# Patient Record
Sex: MALE | Race: WHITE | URBAN - METROPOLITAN AREA
[De-identification: names, ages, dates, MRNs, and addresses within clinical notes are randomized per-mention and may not be internally consistent; named-entity substitution may affect disease eponyms.]

---

## 2018-01-11 ENCOUNTER — TRANSFERRED RECORDS (OUTPATIENT)
Dept: HEALTH INFORMATION MANAGEMENT | Facility: CLINIC | Age: 73
End: 2018-01-11

## 2018-02-27 ENCOUNTER — TRANSFERRED RECORDS (OUTPATIENT)
Dept: HEALTH INFORMATION MANAGEMENT | Facility: CLINIC | Age: 73
End: 2018-02-27

## 2018-04-20 ENCOUNTER — TRANSFERRED RECORDS (OUTPATIENT)
Dept: HEALTH INFORMATION MANAGEMENT | Facility: CLINIC | Age: 73
End: 2018-04-20

## 2018-05-02 ENCOUNTER — TRANSFERRED RECORDS (OUTPATIENT)
Dept: HEALTH INFORMATION MANAGEMENT | Facility: CLINIC | Age: 73
End: 2018-05-02

## 2018-05-02 ENCOUNTER — MEDICAL CORRESPONDENCE (OUTPATIENT)
Dept: HEALTH INFORMATION MANAGEMENT | Facility: CLINIC | Age: 73
End: 2018-05-02

## 2018-05-14 ENCOUNTER — PRE VISIT (OUTPATIENT)
Dept: NEUROLOGY | Facility: CLINIC | Age: 73
End: 2018-05-14

## 2018-05-14 NOTE — TELEPHONE ENCOUNTER
FUTURE VISIT INFORMATION      FUTURE VISIT INFORMATION:    Date: 6/6/18    Time: 2p    [place] [facility] [place]:    Referring provider:  Dr. David Linda    Referring providers clinic:  [facility]    Reason for visit/diagnosis  Parkinsonian features; apraxia of left hand; and upper motor neuron facial palsy    RECORDS REQUESTED FROM:       Clinic name Comments Records Status Imaging Status   [facility] Referral, OV, Imaging Receieved                                    RECORDS STATUS        RECORDS RECEIVED FROM: [facility]   DATE RECEIVED: 5/14/18   NOTES (FOR ALL VISITS) STATUS DETAILS   OFFICE NOTE from referring provider Received  5/2/18, 4/20/18, 1/11/18   OFFICE NOTE from other specialist N/A    DISCHARGE SUMMARY from hospital N/A    DISCHARGE REPORT from the ER N/A    OPERATIVE REPORT N/A    MEDICATION LIST Received 5/2/18   IMAGING  (FOR ALL VISITS)     EMG N/A    EEG N/A    ECT N/A    MRI (HEAD, NECK, SPINE) Received MR Brain report: 2/27/18   CT (HEAD, NECK, SPINE) Received CT Head report: 7/11/16

## 2018-05-27 ASSESSMENT — ENCOUNTER SYMPTOMS
HYPERTENSION: 1
POLYDIPSIA: 0
SYNCOPE: 0
SNORES LOUDLY: 1
DYSURIA: 0
MYALGIAS: 0
BACK PAIN: 1
ORTHOPNEA: 0
EYE IRRITATION: 0
PALPITATIONS: 1
POSTURAL DYSPNEA: 0
FEVER: 0
ALTERED TEMPERATURE REGULATION: 0
HEADACHES: 0
MEMORY LOSS: 1
ARTHRALGIAS: 0
HEMATURIA: 0
FLANK PAIN: 0
NECK PAIN: 0
STIFFNESS: 1
COUGH DISTURBING SLEEP: 0
COUGH: 1
DECREASED APPETITE: 1
BRUISES/BLEEDS EASILY: 1
SLEEP DISTURBANCES DUE TO BREATHING: 0
SPEECH CHANGE: 0
HALLUCINATIONS: 0
EYE WATERING: 0
WHEEZING: 0
CHILLS: 0
SWOLLEN GLANDS: 0
NIGHT SWEATS: 0
MUSCLE CRAMPS: 0
DYSPNEA ON EXERTION: 0
INCREASED ENERGY: 1
DISTURBANCES IN COORDINATION: 1
LOSS OF CONSCIOUSNESS: 0
JOINT SWELLING: 0
HYPOTENSION: 0
SPUTUM PRODUCTION: 0
DOUBLE VISION: 1
EYE REDNESS: 0
POLYPHAGIA: 0
WEIGHT GAIN: 0
NUMBNESS: 0
FATIGUE: 1
WEIGHT LOSS: 1
WEAKNESS: 1
TINGLING: 0
SEIZURES: 0
MUSCLE WEAKNESS: 1
LIGHT-HEADEDNESS: 1
LEG PAIN: 0
TREMORS: 0
EXERCISE INTOLERANCE: 1
DIFFICULTY URINATING: 0
HEMOPTYSIS: 0
DIZZINESS: 1
SHORTNESS OF BREATH: 0
PARALYSIS: 0
EYE PAIN: 0

## 2018-06-04 NOTE — TELEPHONE ENCOUNTER
FUTURE VISIT INFORMATION      FUTURE VISIT INFORMATION:    Date: 06/06/2018    Time:     Location:   REFERRAL INFORMATION:    Referring provider:  Chaz Siu MD    Referring providers clinic:      Reason for visit/diagnosis      RECORDS REQUESTED FROM:       Clinic name Comments Records Status Imaging Status   [facility]        Referral, OV, Imaging Receieved                                    RECORDS STATUS      RECORDS RECEIVED FROM: [facility]   DATE RECEIVED: 5/14/18   NOTES (FOR ALL VISITS) STATUS DETAILS   OFFICE NOTE from referring provider Received  5/2/18, 4/20/18, 1/11/18   OFFICE NOTE from other specialist N/A     DISCHARGE SUMMARY from hospital N/A     DISCHARGE REPORT from the ER N/A     OPERATIVE REPORT N/A     MEDICATION LIST Received 5/2/18   IMAGING  (FOR ALL VISITS)       EMG N/A     EEG N/A     ECT N/A     MRI (HEAD, NECK, SPINE) Received MR Brain report: 2/27/18   CT (HEAD, NECK, SPINE) Received CT Head report: 7/11/16

## 2018-06-06 ENCOUNTER — PRE VISIT (OUTPATIENT)
Dept: NEUROLOGY | Facility: CLINIC | Age: 73
End: 2018-06-06

## 2018-06-06 ENCOUNTER — OFFICE VISIT (OUTPATIENT)
Dept: NEUROLOGY | Facility: CLINIC | Age: 73
End: 2018-06-06
Payer: COMMERCIAL

## 2018-06-06 VITALS
SYSTOLIC BLOOD PRESSURE: 112 MMHG | WEIGHT: 214.9 LBS | HEART RATE: 79 BPM | DIASTOLIC BLOOD PRESSURE: 79 MMHG | BODY MASS INDEX: 30.77 KG/M2 | HEIGHT: 70 IN

## 2018-06-06 DIAGNOSIS — G31.09 OTHER FRONTOTEMPORAL DEMENTIA WITHOUT BEHAVIORAL DISTURBANCE: Primary | ICD-10-CM

## 2018-06-06 DIAGNOSIS — F02.80 OTHER FRONTOTEMPORAL DEMENTIA WITHOUT BEHAVIORAL DISTURBANCE: Primary | ICD-10-CM

## 2018-06-06 RX ORDER — DOXAZOSIN 8 MG/1
8 TABLET ORAL
COMMUNITY
Start: 2016-12-19

## 2018-06-06 RX ORDER — METOPROLOL SUCCINATE 100 MG/1
100 TABLET, EXTENDED RELEASE ORAL DAILY
COMMUNITY
Start: 2016-12-19

## 2018-06-06 RX ORDER — WARFARIN SODIUM 5 MG/1
TABLET ORAL
COMMUNITY
Start: 2017-06-14

## 2018-06-06 RX ORDER — CARBIDOPA AND LEVODOPA 25; 100 MG/1; MG/1
TABLET ORAL
COMMUNITY
Start: 2018-04-20 | End: 2019-08-07

## 2018-06-06 RX ORDER — ASPIRIN 81 MG/1
81 TABLET ORAL DAILY
COMMUNITY
Start: 2015-08-06

## 2018-06-06 RX ORDER — LISINOPRIL 40 MG/1
40 TABLET ORAL DAILY
COMMUNITY
Start: 2017-06-13

## 2018-06-06 RX ORDER — AMLODIPINE BESYLATE 10 MG/1
10 TABLET ORAL DAILY
COMMUNITY
Start: 2017-06-13

## 2018-06-06 ASSESSMENT — PAIN SCALES - GENERAL: PAINLEVEL: NO PAIN (0)

## 2018-06-06 NOTE — PROGRESS NOTES
Department of Neurology  Movement Disorders Division   Initial Clinic Evaluation     Patient: Elsy Paul   MRN: [id number]   : 1945   Date of Visit: 2018     Referring Physician: Alexei    Reason for Referral: Question atypical parkinsonism    Impression:  1.  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  Frequent falls  3.  Cognitive decline  4.  Abnormal extraocular movements    Recommendations:  1.  Discussed in full with patient and family.  I think that the abnormal extraocular eye movements, the early falls in the spastic dysarthria point towards progressive supraocular palsy.  The asymmetric rigidity and apraxia of the arm point towards cortical basal syndrome.  These are both abnormalities most often of tau protein metabolism.  2.  There is no diagnostic blood or spinal fluid tests.  I think the strongest evidence that this is a tau disorder would be found on a PET scan.  The patient has dementia and personality change which would suggest that he may have a syndrome resembling frontotemporal dementia.  If we see cortical changes of frontotemporal dementia or cortical basal degeneration I think the diagnosis would be more secure.  3.  In all likelihood we would try and escalating trial of carbidopa/levodopa as it is the only measure that might help Mr. Paul however unlikely.  I will write to Dr. Marshall after the PET scan and make final recommendations.      Please call or write with questions or concerns,    Sincerely yours,    Chaz Siu MD      History of Present Illness  Mr. Paul is a 72 year old male  who is right handed.  He comes with his son and daughter.  His daughter asked to be the primary contact.  Her name is jessica and alejandro.    The patient has no family history of parkinsonism or dementia.  He has no history of severe head injury.  He has no history of toxic exposure.    His sense of smell has been  normal.  His wife is passed away but he recalls her saying that he woke up in his sleep but clear dream enactment is not documented.    3 years ago he began to complain of stiffness.  He saw a rheumatologist who felt he might have arthritis.  He was treated with multiple medications but did not improve.  His family wondered if these medications were going to help him.  In the fall 2017 the family mention this to their family physician physician Dr. Santiago.  He does a CT scan and wondered if the patient was having strokes.  The patient suffered a severe fall in January 2018 injuring himself.  February he saw Dr. Ashton.  Cognitive tests were performed and Parkinson's symptoms were noted.  The patient was started on carbidopa/levodopa 25/100, 1 tablet 3 times a day.  It was also noticed that the patient had a central left facial droop which have been present for about 2 years.  In April the patient was seen again by Dr. Linda.  There is no improvement.  Dr. Linda repeated the cognitive test.  There was concern that the patient might have an atypical Parkinson syndrome such as cortical basal degeneration.  The patient was then referred to us in the movement to disorder Center at the [facility].    The patient has not had tremor.  He has had micrographia.  His speech is soft.  It is slow.  His swallowing is normal.  Vision is essentially normal except for occasional diplopia.  He has trouble buttoning clothing.  He has trouble getting up out of a chair.  He sits in the chair with a kaboom.  He has trouble turning in bed.  His gait is stooped and shuffling.  He does not have freezing.  He does have festination.  He will fall suddenly without warning.  As noted he can fall with injury.  Falls have become more more frequent.  He has no alien limb or mild clonus type symptoms.  His memory is poor but he has had no hallucination.  His son says that he has noticed that his father has a change in  "personality.  He used to be social but now is much less social.  The patient had an MRI scan of the brain.  We reviewed the report which showed some small white matter change but no diagnostic changes.  We would like to have these films to review.    The patient lives alone and does some driving.  The family is noticing cognitive decline.  He also notices personality change.  His son says that his father is \"not the same rachel\".      Past Medical History:   Past Medical History:   Diagnosis Date     Cancer (H) 2003    Colon Cancer     Dementia     Some confusion and repetition in speaking.     Head injury 4/20/18    Fell and hit head on sidewalk; broke skin and bled.     Hypertension Forever    cannot remember when started on meds       Past Surgical History:   Past Surgical History:   Procedure Laterality Date     COLONOSCOPY  2016    Have them every 3 years     HERNIA REPAIR  1960 and 2002 (approx.)    Hernia repair both times       Medications:  Current Outpatient Prescriptions   Medication Sig Dispense Refill     amLODIPine (NORVASC) 10 MG tablet Take 10 mg by mouth daily       aspirin 81 MG EC tablet Take 81 mg by mouth daily       carbidopa-levodopa (SINEMET)  MG per tablet        doxazosin (CARDURA) 8 MG tablet Take 8 mg by mouth       lisinopril (PRINIVIL/ZESTRIL) 40 MG tablet Take 40 mg by mouth daily       metoprolol succinate (TOPROL-XL) 100 MG 24 hr tablet Take 100 mg by mouth       warfarin (COUMADIN) 5 MG tablet Take 1 tab (5mg) by mouth on Mondays and 0.5 tab (2.5mg) all other days of the week OR as directed by [facility]            Movement Disorder-related Medications                   am noon pm     Carbidopa levodopa 25/100                                                1 1 1                                                                                 Allergies: has no allergies on file.    Social History:   Social History     Social History     Marital status:      " Spouse name: N/A     Number of children: N/A     Years of education: N/A     Social History Main Topics     Smoking status: Former Smoker     Types: Cigarettes     Start date: 1/1/1959     Quit date: 1/1/1966     Smokeless tobacco: Never Used     Alcohol use No     Drug use: No     Sexual activity: No     Other Topics Concern     Parent/Sibling W/ Cabg, Mi Or Angioplasty Before 65f 55m? Yes     Father - Lucas Paul     Social History Narrative     None       Family History:  Family History   Problem Relation Age of Onset     CANCER Mother      Lung     CANCER Brother      Lymphophoma     DIABETES Brother      CANCER Brother      Prostate     HEART DISEASE Father      Depression Daughter      Migraines Sister        ROS:  General:  Fever : no, Chills: no, Sweats: no, Fatigue: no, ,Weight loss: no  Cardiovascular  Chest Pains: no, Palpitations: no, Edema: no, Shortness of breath: no  Respiratory  Cough: no  Gastrointestinal  Nausea: no, Vomiting: no, Diarrhea: no, Constipation: no  Genitourinary  Dysuria: no, Frequency: no, Urgency: no,  Nocturia: no, Incontinence: no Women:  Abnormal vaginal bleeding: no  Musculoskeletal  Back pain: no, Neck Pain: no  Joint pain, swelling, redness: no, Stiffness: no  Skin  Rash: no, Itching: no,  Suspicious lesions: no  Psychiatric  Depression: no, Anxiety/Panic: no  Endocrine  Cold intolerance: no, Heat intolerance: no, Excessive thirst: no  Hematologic/LymphatiAbnormal bruising, bleeding: no ,Enlarged lymph nodes: {[id number]  Allergic/Immunologic  Hives (Urticaria): no, HIV exposure: no    Neurologic  Visual loss: no, Double vision: no, Headache: no, Loss of consciousness:  no, Seizure: no, Fainting (syncope): no, Dysarthria: no, Dysphagia:  no, Vertigo:  no, Weakness: no, Atrophy: no, Twitches: no, Lhermitte's: no, Numbness or tingling: no, Handwriting change: no, Tremors: no, Involuntary movements: no, Imbalance: no, Abnormal gait:  no, Falls :no, Memory loss: no, RBD: no, Sleep  disorder: no, Hallucination: no, Loss of concentration: no,  Behavioral change: no,  Loss of motivation: no      Comprehensive Neurologic Exam    Mental Status Exam   The patient is alert, oriented and exhibits no difficulty with cognition or memory.  A formal short test of mental status was performed.  [place] 29/34  Neurovascular         Speech and Language   Right Left     Carotid Bruit Absent Absent  Speech is not normal.   Dorsalis Pedis Pulse Normal Normal  Description  Spastic dysarthria   Posterior Tibial Pulse Normal Normal  Language is normal.     Cranial Nerve Exam                 Right Left   Right Left   II                                        Normal Normal  Hearing (VIII) Normal Normal      III, IV, V!                   Abnormal abnormal  Nystagmus (VIII) Normal Normal   EOM description                           Macro square wave jerks  Saccadic pursuit. Slow saccades  Gag (IX, X) Normal Normal   Facial Sensation (V) Normal Normal  SCM (XI) Normal Normal   Muscles of Mastication (V) Normal Normal  Trapezius (XI) Normal Normal   Facial Strength (VII) Normal Normal  Tongue (XII) Normal Normal     Motor Exam  Strength (*/5 grading)  Muscle                  Right Left  Muscle Right Left   Frontalis                                           Normal Normal  Iliopsoas Normal    Normal          Orbicularis Oculi                     Normal Normal  Quadrideps Normal    Normal        Orbicularis Oris                         Normal Normal  Anterior Tibial Normal Normal      Deltoid Normal Normal  Gastrocnemius Normal    Normal   Biceps Normal Normal  Extensor Hallucis Longus Normal    Normal   Triceps Normal Normal  Toe Extensors Normal    Normal   EDC Normal Normal  Toe Flexor Normal    Normal   Finger Flexors Normal Normal  Other Normal Normal   First Dorsal Interosseous Normal Normal  Other Normal Normal   Hypothenar Normal Normal  Other Normal Normal   Thenar Normal Normal  Other Normal Normal      Reflexes      Tone   Right Left   Right Left   Biceps Normal Normal  Spasticity (S)  Rigidity (R)     Triceps Normal Normal  Neck     Brachioradialis Normal Normal  Arm +2 +3   Quadriceps Normal Normal  Leg +1 +1   Ankle Normal Normal       Babkinski Flexor Flexor         AMR       Coordination   Right Left   Right Left   Fingers -2 -3  Finger nose finger Normal Normal   Hand -1 -3  Drift Normal Normal   Leg Normal -3  Heel Shin Normal Normal   Foot Normal -3  Other     Other           Apraxia left hand    Involuntary Movements    Gait and Station  None            Right Left  Stand & Sit Normal   (Movement type) Normal Normal  Gait Normal   (Movement type) Normal Normal  Tandem Normal   (Movement type) Normal Normal  Romberg Absent     No alien limb  Sensory Exam          Right Left    Pin Normal Normal    Vibration Normal Normal    Joint position Normal Normal    Other         No cortical sensory loss    Coding statement:     Duration of  Services: face-to-face 70 min.   Greater than 50% of this visit was spent in counseling and coordination of care.    Medical decision making is high due to the following components:    Number of diagnoses:[id number]  ManagementDiagnostic tests orders or reviewed.    Complexity of medical data:Outside records reviewed.  Called outside records.    Took collateral history.  Risk  One or more chronic illnesses with severe exacerbation, progression, or side effects of treatment.  Acute or chronic illness or injury that poses a threat to life or bodily function.

## 2018-06-06 NOTE — PATIENT INSTRUCTIONS
#1 PET scan  #2  See back after above    Fall Prevention  Falls often occur due to slipping, tripping or losing your balance. Millions of people fall every year and injure themselves. Here are ways to reduce your risk of falling again.    Think about your fall, was there anything that caused your fall that can be fixed, removed, or replaced?    Make your home safe by keeping walkways clear of objects you may trip over.    Use non-slip pads under rugs. Do not use area rugs or small throw rugs.    Use non-slip mats in bathtubs and showers.    Install handrails and lights on staircases.    Do not walk in poorly lit areas.    Do not stand on chairs or wobbly ladders.    Use caution when reaching overhead or looking upward. This position can cause a loss of balance.    Be sure your shoes fit properly, have non-slip bottoms and are in good condition.     Wear shoes both inside and out. Avoid going barefoot or wearing slippers.    Be cautious when going up and down stairs, curbs, and when walking on uneven sidewalks.    If your balance is poor, consider using a cane or walker.    If your fall was related to alcohol use, stop or limit alcohol intake.     If your fall was related to use of sleeping medicines, talk to your doctor about this. You may need to reduce your dosage at bedtime if you awaken during the night to go to the bathroom.      To reduce the need for nighttime bathroom trips:  ? Avoid drinking fluids for several hours before going to bed  ? Empty your bladder before going to bed  ? Men can keep a urinal at the bedside    Stay as active as you can. Balance, flexibility, strength, and endurance all come from exercise. They all play a role in preventing falls. Ask your healthcare provider which types of activity are right for you.    Get your vision checked on a regular basis.    If you have pets, know where they are before you stand up or walk so you don't trip over them.    Use night lights.  Date Last  Reviewed: 11/5/2015 2000-2017 [employer] [address] [place] [address], [place] [address]. All rights reserved. This information is not intended as a substitute for professional medical care. Always follow your healthcare professional's instructions.

## 2018-06-06 NOTE — NURSING NOTE
Chief Complaint   Patient presents with     Consult     P NEW - MOVEMENT DISORDER     Larisa Downs MA

## 2018-06-06 NOTE — MR AVS SNAPSHOT
After Visit Summary   6/6/2018    Elsy Paul    MRN: [id number]           Patient Information     Date Of Birth          1945        Visit Information        Provider Department      6/6/2018 2:00 PM Chaz Siu MD [facility] Neurology        Today's Diagnoses     Other frontotemporal dementia without behavioral disturbance    -  1      Care Instructions      At your visit today, we discussed your risk for falls and preventive options.    Fall Prevention  Falls often occur due to slipping, tripping or losing your balance. Millions of people fall every year and injure themselves. Here are ways to reduce your risk of falling again.    Think about your fall, was there anything that caused your fall that can be fixed, removed, or replaced?    Make your home safe by keeping walkways clear of objects you may trip over.    Use non-slip pads under rugs. Do not use area rugs or small throw rugs.    Use non-slip mats in bathtubs and showers.    Install handrails and lights on staircases.    Do not walk in poorly lit areas.    Do not stand on chairs or wobbly ladders.    Use caution when reaching overhead or looking upward. This position can cause a loss of balance.    Be sure your shoes fit properly, have non-slip bottoms and are in good condition.     Wear shoes both inside and out. Avoid going barefoot or wearing slippers.    Be cautious when going up and down stairs, curbs, and when walking on uneven sidewalks.    If your balance is poor, consider using a cane or walker.    If your fall was related to alcohol use, stop or limit alcohol intake.     If your fall was related to use of sleeping medicines, talk to your doctor about this. You may need to reduce your dosage at bedtime if you awaken during the night to go to the bathroom.      To reduce the need for nighttime bathroom trips:  ? Avoid drinking fluids for several hours before going to bed  ? Empty your bladder before going to bed  ? Men  can keep a urinal at the bedside    Stay as active as you can. Balance, flexibility, strength, and endurance all come from exercise. They all play a role in preventing falls. Ask your healthcare provider which types of activity are right for you.    Get your vision checked on a regular basis.    If you have pets, know where they are before you stand up or walk so you don't trip over them.    Use night lights.  Date Last Reviewed: 11/5/2015 2000-2017 The "[employer]. [address] [place] [address]. All rights reserved. This information is not intended as a substitute for professional medical care. Always follow your healthcare professional's instructions.                Follow-ups after your visit        Follow-up notes from your care team     Return in about 5 weeks (around 7/10/2018), or after Pet scan.      Your next 10 appointments already scheduled     Jun 16, 2018 10:30 AM CDT   PE NPET BRAIN with UUPET1   [facility] [place] PET CT ([facility])    [address] [facility] [address] [phone]           Tell your doctor:   If there is any chance you may be pregnant or if you are breastfeeding.   If you have problems lying in small spaces (claustrophobia). If you do, your doctor may give you medicine to help you relax. If you have diabetes:   Have your exam early in the morning. Your blood glucose will go up as the day goes by.   Your glucose level must be 180 or less at the start of the exam. Please take any medicines you need to ensure this blood glucose level.   If you are taking insulin in the morning take with breakfast by 6 am and schedule procedure between 12 and 2:15 pm.   If you are taking insulin at night take nightly dose, fast overnight, schedule procedure before 10 am.   If you take insulin both morning and night take morning dose by 6am and schedule procedure between 12 and 2:15 pm.   24 hours before your scan: Don t  do any heavy exercise. (No jogging, aerobics or other workouts.) Exercise will make your pictures less accurate.  At least 7 hours before your scan, or the evening before if you have an early appointment: Eat a low carb, high protein meal (Lean meats, seafood, beans, soy, low-fat dairy, eggs, nuts & seeds). 6 hours before your scan:   Stop all food and liquids (except water).   Do not chew gum or suck on mints.   If you need to take medicine with food, you may take it with a few crackers.  Please call your Imaging Department at your exam site with any questions.            Jun 27, 2018 12:30 PM CDT   (Arrive by 12:15 PM)   Return Movement Disorder with Chaz Siu MD   [facility] ([facility])    [address] [place] [address] [phone]              Future tests that were ordered for you today     Open Future Orders        Priority Expected Expires Ordered    PET Brain Routine  6/6/2019 6/6/2018            Who to contact     Please call your clinic at [phone] to:    Ask questions about your health    Make or cancel appointments    Discuss your medicines    Learn about your test results    Speak to your doctor            Additional Information About Your Visit        Smart GPS Backpack Information     [employer] Backpack gives you secure access to your electronic health record. If you see a primary care provider, you can also send messages to your care team and make appointments. If you have questions, please call your primary care clinic.  If you do not have a primary care provider, please call [phone] and they will assist you.      Smart [employer] Backpack is an electronic gateway that provides easy, online access to your medical records. With [employer] Backpack, you can request a clinic appointment, read your test results, renew a prescription or communicate with your care team.     To access your existing account, please contact your [facility] or call  "[phone] for assistance.        Care EveryWhere ID     This is your Care EveryWhere ID. This could be used by other organizations to access your [facility] medical records  [id number]        Your Vitals Were     Pulse Height BMI (Body Mass Index)             79 1.778 m (5' 10\") 30.83 kg/m2          Blood Pressure from Last 3 Encounters:   06/06/18 112/79    Weight from Last 3 Encounters:   06/06/18 97.5 kg (214 lb 14.4 oz)               Primary Care Provider    None Specified       No primary provider on file.        Equal Access to Services     [facility]: Hadii raul garcia hadasho Soomaali, waaxda luqadaha, qaybta kaalmada adetungyaholly, lizy cruz . So [facility] [phone].    ATENCIÓN: Si habla español, tiene a daley disposición servicios gratuitos de asistencia lingüística. Robert al [phone].    We comply with applicable federal civil rights laws and [place] laws. We do not discriminate on the basis of race, color, national origin, age, disability, sex, sexual orientation, or gender identity.            Thank you!     Thank you for choosing [facility]UROLOGY  for your care. Our goal is always to provide you with excellent care. Hearing back from our patients is one way we can continue to improve our services. Please take a few minutes to complete the written survey that you may receive in the mail after your visit with us. Thank you!             Your Updated Medication List - Protect others around you: Learn how to safely use, store and throw away your medicines at www.disposemymeds.org.          This list is accurate as of 6/6/18  3:54 PM.  Always use your most recent med list.                   Brand Name Dispense Instructions for use Diagnosis    amLODIPine 10 MG tablet    NORVASC     Take 10 mg by mouth daily        aspirin 81 MG EC tablet      Take 81 mg by mouth daily        carbidopa-levodopa  MG per tablet    SINEMET          doxazosin 8 MG tablet    CARDURA     Take 8 " mg by mouth        lisinopril 40 MG tablet    PRINIVIL/ZESTRIL     Take 40 mg by mouth daily        metoprolol succinate 100 MG 24 hr tablet    TOPROL-XL     Take 100 mg by mouth        warfarin 5 MG tablet    COUMADIN     Take 1 tab (5mg) by mouth on Mondays and 0.5 tab (2.5mg) all other days of the week OR as directed by [facility]

## 2018-06-06 NOTE — LETTER
2018       RE: Elsy Paul  [address]  [place] [address]     Dear Colleague,    Thank you for referring your patient, Elsy Paul, to the [facility] NEUROLOGY at [facility]. Please see a copy of my visit note below.    Department of Neurology  Movement Disorders Division   Initial Clinic Evaluation     Patient: Elsy Paul   MRN: [id number]   : 1945   Date of Visit: 2018     Referring Physician: Alexei    Reason for Referral: Question atypical parkinsonism    Impression:  1.  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  Frequent falls  3.  Cognitive decline  4.  Abnormal extraocular movements    Recommendations:  1.  Discussed in full with patient and family.  I think that the abnormal extraocular eye movements, the early falls in the spastic dysarthria point towards progressive supraocular palsy.  The asymmetric rigidity and apraxia of the arm point towards cortical basal syndrome.  These are both abnormalities most often of tau protein metabolism.  2.  There is no diagnostic blood or spinal fluid tests.  I think the strongest evidence that this is a tau disorder would be found on a PET scan.  The patient has dementia and personality change which would suggest that he may have a syndrome resembling frontotemporal dementia.  If we see cortical changes of frontotemporal dementia or cortical basal degeneration I think the diagnosis would be more secure.  3.  In all likelihood we would try and escalating trial of carbidopa/levodopa as it is the only measure that might help Mr. Paul however unlikely.  I will write to Dr. Marshall after the PET scan and make final recommendations.      Please call or write with questions or concerns,    Sincerely yours,    Chaz Siu MD      History of Present Illness  Mr. Paul is a 72 year old male  who is right handed.  He comes with his son and  daughter.  His daughter asked to be the primary contact.  Her name is eaten and she.    The patient has no family history of parkinsonism or dementia.  He has no history of severe head injury.  He has no history of toxic exposure.    His sense of smell has been normal.  His wife is passed away but he recalls her saying that he woke up in his sleep but clear dream enactment is not documented.    3 years ago he began to complain of stiffness.  He saw a rheumatologist who felt he might have arthritis.  He was treated with multiple medications but did not improve.  His family wondered if these medications were going to help him.  In the fall 2017 the family mention this to their family physician physician Dr. Santiago.  He does a CT scan and wondered if the patient was having strokes.  The patient suffered a severe fall in January 2018 injuring himself.  February he saw Dr. Ashton.  Cognitive tests were performed and Parkinson's symptoms were noted.  The patient was started on carbidopa/levodopa 25/100, 1 tablet 3 times a day.  It was also noticed that the patient had a central left facial droop which have been present for about 2 years.  In April the patient was seen again by Dr. Linda.  There is no improvement.  Dr. Linda repeated the cognitive test.  There was concern that the patient might have an atypical Parkinson syndrome such as cortical basal degeneration.  The patient was then referred to us in the movement to disorder Center at the [facility].    The patient has not had tremor.  He has had micrographia.  His speech is soft.  It is slow.  His swallowing is normal.  Vision is essentially normal except for occasional diplopia.  He has trouble buttoning clothing.  He has trouble getting up out of a chair.  He sits in the chair with a kaboom.  He has trouble turning in bed.  His gait is stooped and shuffling.  He does not have freezing.  He does have festination.  He will fall suddenly without  "warning.  As noted he can fall with injury.  Falls have become more more frequent.  He has no alien limb or mild clonus type symptoms.  His memory is poor but he has had no hallucination.  His son says that he has noticed that his father has a change in personality.  He used to be social but now is much less social.  The patient had an MRI scan of the brain.  We reviewed the report which showed some small white matter change but no diagnostic changes.  We would like to have these films to review.    The patient lives alone and does some driving.  The family is noticing cognitive decline.  He also notices personality change.  His son says that his father is \"not the same rachel\".      Past Medical History:   Past Medical History:   Diagnosis Date     Cancer (H) 2003    Colon Cancer     Dementia     Some confusion and repetition in speaking.     Head injury 4/20/18    Fell and hit head on sidewalk; broke skin and bled.     Hypertension Forever    cannot remember when started on meds       Past Surgical History:   Past Surgical History:   Procedure Laterality Date     COLONOSCOPY  2016    Have them every 3 years     HERNIA REPAIR  1960 and 2002 (approx.)    Hernia repair both times       Medications:  Current Outpatient Prescriptions   Medication Sig Dispense Refill     amLODIPine (NORVASC) 10 MG tablet Take 10 mg by mouth daily       aspirin 81 MG EC tablet Take 81 mg by mouth daily       carbidopa-levodopa (SINEMET)  MG per tablet        doxazosin (CARDURA) 8 MG tablet Take 8 mg by mouth       lisinopril (PRINIVIL/ZESTRIL) 40 MG tablet Take 40 mg by mouth daily       metoprolol succinate (TOPROL-XL) 100 MG 24 hr tablet Take 100 mg by mouth       warfarin (COUMADIN) 5 MG tablet Take 1 tab (5mg) by mouth on Mondays and 0.5 tab (2.5mg) all other days of the week OR as directed by [facility]            Movement Disorder-related Medications                   am noon pm     Carbidopa levodopa 25/100     "                                            1 1 1                                                                                 Allergies: has no allergies on file.    Social History:   Social History     Social History     Marital status:      Spouse name: N/A     Number of children: N/A     Years of education: N/A     Social History Main Topics     Smoking status: Former Smoker     Types: Cigarettes     Start date: 1/1/1959     Quit date: 1/1/1966     Smokeless tobacco: Never Used     Alcohol use No     Drug use: No     Sexual activity: No     Other Topics Concern     Parent/Sibling W/ Cabg, Mi Or Angioplasty Before 65f 55m? Yes     Father - Lucas Paul     Social History Narrative     None       Family History:  Family History   Problem Relation Age of Onset     CANCER Mother      Lung     CANCER Brother      Lymphophoma     DIABETES Brother      CANCER Brother      Prostate     HEART DISEASE Father      Depression Daughter      Migraines Sister        ROS:  General:  Fever : no, Chills: no, Sweats: no, Fatigue: no, ,Weight loss: no  Cardiovascular  Chest Pains: no, Palpitations: no, Edema: no, Shortness of breath: no  Respiratory  Cough: no  Gastrointestinal  Nausea: no, Vomiting: no, Diarrhea: no, Constipation: no  Genitourinary  Dysuria: no, Frequency: no, Urgency: no,  Nocturia: no, Incontinence: no Women:  Abnormal vaginal bleeding: no  Musculoskeletal  Back pain: no, Neck Pain: no  Joint pain, swelling, redness: no, Stiffness: no  Skin  Rash: no, Itching: no,  Suspicious lesions: no  Psychiatric  Depression: no, Anxiety/Panic: no  Endocrine  Cold intolerance: no, Heat intolerance: no, Excessive thirst: no  Hematologic/LymphatiAbnormal bruising, bleeding: no ,Enlarged lymph nodes: {.:[id number]  Allergic/Immunologic  Hives (Urticaria): no, HIV exposure: no    Neurologic  Visual loss: no, Double vision: no, Headache: no, Loss of consciousness:  no, Seizure: no, Fainting (syncope): no, Dysarthria: no,  Dysphagia:  no, Vertigo:  no, Weakness: no, Atrophy: no, Twitches: no, Lhermitte's: no, Numbness or tingling: no, Handwriting change: no, Tremors: no, Involuntary movements: no, Imbalance: no, Abnormal gait:  no, Falls :no, Memory loss: no, RBD: no, Sleep disorder: no, Hallucination: no, Loss of concentration: no,  Behavioral change: no,  Loss of motivation: no      Comprehensive Neurologic Exam    Mental Status Exam   The patient is alert, oriented and exhibits no difficulty with cognition or memory.  A formal short test of mental status was performed.  [facility]  Neurovascular         Speech and Language   Right Left     Carotid Bruit Absent Absent  Speech is not normal.   Dorsalis Pedis Pulse Normal Normal  Description  Spastic dysarthria   Posterior Tibial Pulse Normal Normal  Language is normal.     Cranial Nerve Exam                 Right Left   Right Left   II                                        Normal Normal  Hearing (VIII) Normal Normal      III, IV, V!                   Abnormal abnormal  Nystagmus (VIII) Normal Normal   EOM description                           Macro square wave jerks  Saccadic pursuit. Slow saccades  Gag (IX, X) Normal Normal   Facial Sensation (V) Normal Normal  SCM (XI) Normal Normal   Muscles of Mastication (V) Normal Normal  Trapezius (XI) Normal Normal   Facial Strength (VII) Normal Normal  Tongue (XII) Normal Normal     Motor Exam  Strength (*/5 grading)  Muscle                  Right Left  Muscle Right Left   Frontalis                                           Normal Normal  Iliopsoas Normal    Normal          Orbicularis Oculi                     Normal Normal  Quadrideps Normal    Normal        Orbicularis Oris                         Normal Normal  Anterior Tibial Normal Normal      Deltoid Normal Normal  Gastrocnemius Normal    Normal   Biceps Normal Normal  Extensor Hallucis Longus Normal    Normal   Triceps Normal Normal  Toe Extensors Normal    Normal   EDC Normal  Normal  Toe Flexor Normal    Normal   Finger Flexors Normal Normal  Other Normal Normal   First Dorsal Interosseous Normal Normal  Other Normal Normal   Hypothenar Normal Normal  Other Normal Normal   Thenar Normal Normal  Other Normal Normal     Reflexes      Tone   Right Left   Right Left   Biceps Normal Normal  Spasticity (S)  Rigidity (R)     Triceps Normal Normal  Neck     Brachioradialis Normal Normal  Arm +2 +3   Quadriceps Normal Normal  Leg +1 +1   Ankle Normal Normal       Babkinski Flexor Flexor         AMR       Coordination   Right Left   Right Left   Fingers -2 -3  Finger nose finger Normal Normal   Hand -1 -3  Drift Normal Normal   Leg Normal -3  Heel Shin Normal Normal   Foot Normal -3  Other     Other           Apraxia left hand    Involuntary Movements    Gait and Station  None            Right Left  Stand & Sit Normal   (Movement type) Normal Normal  Gait Normal   (Movement type) Normal Normal  Tandem Normal   (Movement type) Normal Normal  Romberg Absent     No alien limb  Sensory Exam          Right Left    Pin Normal Normal    Vibration Normal Normal    Joint position Normal Normal    Other         No cortical sensory loss    Coding statement:     Duration of  Services: face-to-face 70 min.   Greater than 50% of this visit was spent in counseling and coordination of care.    Medical decision making is high due to the following components:    Number of diagnoses:[id number]  ManagementDiagnostic tests orders or reviewed.    Complexity of medical data:Outside records reviewed.  Called outside records.    Took collateral history.  Risk  One or more chronic illnesses with severe exacerbation, progression, or side effects of treatment.  Acute or chronic illness or injury that poses a threat to life or bodily function.      Sincerely,    Chaz Siu MD

## 2018-06-16 ENCOUNTER — HOSPITAL ENCOUNTER (OUTPATIENT)
Dept: PET IMAGING | Facility: CLINIC | Age: 73
Discharge: HOME | End: 2018-06-16
Attending: PSYCHIATRY & NEUROLOGY | Admitting: PSYCHIATRY & NEUROLOGY
Payer: MEDICARE

## 2018-06-16 DIAGNOSIS — G31.09 OTHER FRONTOTEMPORAL DEMENTIA WITHOUT BEHAVIORAL DISTURBANCE: ICD-10-CM

## 2018-06-16 DIAGNOSIS — F02.80 OTHER FRONTOTEMPORAL DEMENTIA WITHOUT BEHAVIORAL DISTURBANCE: ICD-10-CM

## 2018-06-16 LAB — GLUCOSE BLDC GLUCOMTR-MCNC: 105 MG/DL (ref 70–99)

## 2018-06-16 PROCEDURE — 82962 GLUCOSE BLOOD TEST: CPT

## 2018-06-16 PROCEDURE — 34300033 ZZH RX 343: Performed by: PSYCHIATRY & NEUROLOGY

## 2018-06-16 PROCEDURE — 78608 BRAIN IMAGING (PET): CPT | Mod: PI

## 2018-06-16 PROCEDURE — A9552 F18 FDG: HCPCS | Performed by: PSYCHIATRY & NEUROLOGY

## 2018-06-16 RX ADMIN — FLUDEOXYGLUCOSE F-18 10.22 MCI.: 500 INJECTION, SOLUTION INTRAVENOUS at 10:36

## 2018-06-27 ENCOUNTER — OFFICE VISIT (OUTPATIENT)
Dept: NEUROLOGY | Facility: CLINIC | Age: 73
End: 2018-06-27
Payer: COMMERCIAL

## 2018-06-27 VITALS
DIASTOLIC BLOOD PRESSURE: 88 MMHG | SYSTOLIC BLOOD PRESSURE: 130 MMHG | RESPIRATION RATE: 24 BRPM | WEIGHT: 213.3 LBS | OXYGEN SATURATION: 98 % | TEMPERATURE: 98.1 F | BODY MASS INDEX: 30.54 KG/M2 | HEIGHT: 70 IN | HEART RATE: 84 BPM

## 2018-06-27 DIAGNOSIS — G31.85 CORTICOBASAL SYNDROME (H): Primary | ICD-10-CM

## 2018-06-27 PROBLEM — G51.0: Status: ACTIVE | Noted: 2018-04-20

## 2018-06-27 PROBLEM — G20.A1 PARKINSON DISEASE (H): Status: ACTIVE | Noted: 2018-02-12

## 2018-06-27 PROBLEM — R48.2 APRAXIA: Status: ACTIVE | Noted: 2018-04-20

## 2018-06-27 PROBLEM — R29.818 PARKINSONIAN FEATURES: Status: ACTIVE | Noted: 2018-01-11

## 2018-06-27 PROBLEM — G47.33 OSA (OBSTRUCTIVE SLEEP APNEA): Status: ACTIVE | Noted: 2018-04-30

## 2018-06-27 ASSESSMENT — PAIN SCALES - GENERAL: PAINLEVEL: NO PAIN (0)

## 2018-06-27 NOTE — MR AVS SNAPSHOT
After Visit Summary   6/27/2018    Elsy Paul    MRN: [id number]           Patient Information     Date Of Birth          1945        Visit Information        Provider Department      6/27/2018 3:00 PM Chaz Siu MD [facility] Neu[facility]        Today's Diagnoses     Corticobasal syndrome    -  1      Care Instructions    Impression:  1.  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 We had a long discussion with the patient and his daughter, Shanna () and his son.  The PET scan is highly suggestive of cortical basal syndrome or some other tau disorder.  He has clinical features of both cortical basal degeneration and progressive supraocular palsy.  2.  His daughter asked about clinical trials.  There is a clinical trial for progressive supraocular palsy.  I do not know if he would qualify.  I will ask Deonna Vinicio to give the daughter a call.  3.  We discussed fall prevention.  At present the patient has no swallowing problem.  They asked about long-term plans.  I told him that he can expect to get worse year by year.  I suggested that he begin planning for this.  They have already look for an assisted living situation close to the son's home and I told him this sounded like a good plan.  They asked about driving and I suggested he go get a driving test [facility].  Certainly there is concern about his reaction time.  4.  I suggested that Dr. Linda consider increasing the patient's carbidopa/levodopa.  I have given him initial instructions in this regard.  I recommended that they go up on carbidopa/levodopa to 25/100, 1-1/2 tablets for 2 weeks and if not better go up to 2 tablets 3 times a day.  Admittedly this is unlikely to give great benefit but I think it is worth a try.  If it is not improving him he can go back to 1 tablet 3 times a day.  5.  Should he develop cognitive problems a trial of Rivest take  "mean her donepezil would be indicated.    The patient will return to Dr. Ashton's excellent care.                Follow-ups after your visit        Follow-up notes from your care team     Return if symptoms worsen or fail to improve.      Who to contact     Please call your clinic at [phone] to:    Ask questions about your health    Make or cancel appointments    Discuss your medicines    Learn about your test results    Speak to your doctor            Additional Information About Your Visit        [email or web address] Information     [employer] gives you secure access to your electronic health record. If you see a primary care provider, you can also send messages to your care team and make appointments. If you have questions, please call your primary care clinic.  If you do not have a primary care provider, please call [phone] and they will assist you.      [employer] is an electronic gateway that provides easy, online access to your medical records. With [employer], you can request a clinic appointment, read your test results, renew a prescription or communicate with your care team.     To access your existing account, please contact your [facility] or call [phone] for assistance.        Care EveryWhere ID     This is your Care EveryWhere ID. This could be used by other organizations to access your [place] medical records  [id number]        Your Vitals Were     Pulse Temperature Respirations Height Pulse Oximetry BMI (Body Mass Index)    84 98.1  F (36.7  C) (Oral) 24 1.778 m (5' 10\") 98% 30.61 kg/m2       Blood Pressure from Last 3 Encounters:   06/27/18 130/88   06/06/18 112/79    Weight from Last 3 Encounters:   06/27/18 96.8 kg (213 lb 4.8 oz)   06/06/18 97.5 kg (214 lb 14.4 oz)              Today, you had the following     No orders found for display       Primary Care Provider Office Phone # Fax #    Daron Ye [phone]       [facility]  [address] " [facility] [address]        Equal Access to Services     SAMY DIALLO : Hadii raul garcia daydayparker Coriali, wathomasda madeleine, qaozkori dunnmalizy barajas. So [facility] [phone].    ATENCIÓN: Si habla español, tiene a daley disposición servicios gratuitos de asistencia lingüística. [facility] [phone].    We comply with applicable federal civil rights laws and [place] laws. We do not discriminate on the basis of race, color, national origin, age, disability, sex, sexual orientation, or gender identity.            Thank you!     Thank you for choosing [facility]UROLOG[facility]  for your care. Our goal is always to provide you with excellent care. Hearing back from our patients is one way we can continue to improve our services. Please take a few minutes to complete the written survey that you may receive in the mail after your visit with us. Thank you!             Your Updated Medication List - Protect others around you: Learn how to safely use, store and throw away your medicines at www.disposemymeds.org.          This list is accurate as of 6/27/18  5:07 PM.  Always use your most recent med list.                   Brand Name Dispense Instructions for use Diagnosis    amLODIPine 10 MG tablet    NORVASC     Take 10 mg by mouth daily        aspirin 81 MG EC tablet      Take 81 mg by mouth daily        carbidopa-levodopa  MG per tablet    SINEMET          doxazosin 8 MG tablet    CARDURA     Take 8 mg by mouth        lisinopril 40 MG tablet    PRINIVIL/ZESTRIL     Take 40 mg by mouth daily        metoprolol succinate 100 MG 24 hr tablet    TOPROL-XL     Take 100 mg by mouth        warfarin 5 MG tablet    COUMADIN     Take 1 tab (5mg) by mouth on Mondays and 0.5 tab (2.5mg) all other days of the week OR as directed by [facility]

## 2018-06-27 NOTE — LETTER
6/27/2018       RE: Elsy Paul  [address]  [place] [address]     Dear Colleague,    Thank you for referring your patient, Elsy Paul, to the [facility] NEUROLOGY at [facility]. Please see a copy of my visit note below.    Movement Disorder Clinic follow up note    Patient: Elsy Paul  Medical Record Number: [id number]  Encounter Date: June 27, 2018  PCP:Daron Ye    CC: Cortical basal degeneration    Impression:  1.  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 We had a long discussion with the patient and his daughter, Shanna () and his son.  The PET scan is highly suggestive of cortical basal syndrome or some other tau disorder.  He has clinical features of both cortical basal degeneration and progressive supraocular palsy.  2.  His daughter asked about clinical trials.  There is a clinical trial for progressive supraocular palsy.  I do not know if he would qualify.  I will ask Deonna Mooney to give the daughter a call.  3.  We discussed fall prevention.  At present the patient has no swallowing problem.  They asked about long-term plans.  I told him that he can expect to get worse year by year.  I suggested that he begin planning for this.  They have already look for an assisted living situation close to the son's home and I told him this sounded like a good plan.  They asked about driving and I suggested he go get a driving test [facility].  Certainly there is concern about his reaction time.  4.  I suggested that Dr. Linda consider increasing the patient's carbidopa/levodopa.  I have given him initial instructions in this regard.  I recommended that they go up on carbidopa/levodopa to 25/100, 1-1/2 tablets for 2 weeks and if not better go up to 2 tablets 3 times a day.  Admittedly this is unlikely to give great benefit but I think it is worth a try.  If it is not improving him he can go  "back to 1 tablet 3 times a day.  5.  Should he develop cognitive problems a trial of Rivest take mean her donepezil would be indicated.    The patient will return to Dr. Ashotn's excellent care.      Return to clinic: PRN    Interval Hx:: Mr. Elsy Paul had his PET scan.  This demonstrates straits hypometabolism in the parietal and frontal lobes.  This is worse on the right than the left.  This is consistent with the patient's asymmetric rigidity of his left arm.  The radiologist felt this was most consistent with cortical basal syndrome.  I agree with this.  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    He is doing well.  He continues to live alone in [facility].  Family however is pursuing a move to the [facility] near them.  They are looking at a assisted living center near his son.    The patient continues on carbidopa/levodopa 25 100, 1 tablet 3 times a day.  He does not notice any benefit with this.    current medications  Current Outpatient Prescriptions   Medication     amLODIPine (NORVASC) 10 MG tablet     aspirin 81 MG EC tablet     carbidopa-levodopa (SINEMET)  MG per tablet     doxazosin (CARDURA) 8 MG tablet     lisinopril (PRINIVIL/ZESTRIL) 40 MG tablet     metoprolol succinate (TOPROL-XL) 100 MG 24 hr tablet     warfarin (COUMADIN) 5 MG tablet     No current facility-administered medications for this visit.        Examination:  /88  Pulse 84  Temp 98.1  F (36.7  C) (Oral)  Resp 24  Ht 1.778 m (5' 10\")  Wt 96.8 kg (213 lb 4.8 oz)  SpO2 98%  BMI 30.61 kg/m2  General- no distress, no rash, good pulses, no edema  Respiratory-auscultation over the anterior lung fields is clear  Cardiac- regular rate and rhythm    Neurologic  Mental status  Patient is alert, appropriate, speech is fluent and comprehension is intact    Cranial nerve testing  Pupils are equal and reactive to light, visual fields are full to " confrontation bilaterally  Extraocular movements are full  Facial sensation is intact, face is symmetric with rest and activation  Palate rises symmetrically, tongue protrudes at midline with normal movements  Sterocleidomastoid and trapezius strength is normal and symmetric    Motor  Bulk and tone are normal  Strength is 5/5 shoulder abduction, finger abduction, arm flexion and extension, hip flexion, plantar and dorsiflexion    Sensation  Intact to pin, vibration   Proprioception intact in great toes and fingers    Tendon reflexes  Testing at brachioradialis, biceps, triceps, patella and achilles bilaterally showed normal reflexes, symmetrically, without Babinski or Mart signs    Coordination  Finger nose finger testing: normalRapid alternating movements are normal.  Gait and Station: normal      Again, thank you for allowing me to participate in the care of your patient.      Sincerely,    Chaz Siu MD

## 2018-06-27 NOTE — PROGRESS NOTES
Movement Disorder Clinic follow up note    Patient: Elsy Paul  Medical Record Number: [id number]  Encounter Date: June 27, 2018  PCP:Daron Ye    CC: Cortical basal degeneration    Impression:  1.  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 We had a long discussion with the patient and his daughter, Shanna () and his son.  The PET scan is highly suggestive of cortical basal syndrome or some other tau disorder.  He has clinical features of both cortical basal degeneration and progressive supraocular palsy.  2.  His daughter asked about clinical trials.  There is a clinical trial for progressive supraocular palsy.  I do not know if he would qualify.  I will ask Deonna Mooney to give the daughter a call.  3.  We discussed fall prevention.  At present the patient has no swallowing problem.  They asked about long-term plans.  I told him that he can expect to get worse year by year.  I suggested that he begin planning for this.  They have already look for an assisted living situation close to the son's home and I told him this sounded like a good plan.  They asked about driving and I suggested he go get a driving test [facility].  Certainly there is concern about his reaction time.  4.  I suggested that Dr. Linda consider increasing the patient's carbidopa/levodopa.  I have given him initial instructions in this regard.  I recommended that they go up on carbidopa/levodopa to 25/100, 1-1/2 tablets for 2 weeks and if not better go up to 2 tablets 3 times a day.  Admittedly this is unlikely to give great benefit but I think it is worth a try.  If it is not improving him he can go back to 1 tablet 3 times a day.  5.  Should he develop cognitive problems a trial of Rivest take mean her donepezil would be indicated.    The patient will return to Dr. Ashton's excellent care.      Return to clinic: PRN    Interval Hx:: Mr. Elsy Paul  "had his PET scan.  This demonstrates straits hypometabolism in the parietal and frontal lobes.  This is worse on the right than the left.  This is consistent with the patient's asymmetric rigidity of his left arm.  The radiologist felt this was most consistent with cortical basal syndrome.  I agree with this.  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    He is doing well.  He continues to live alone in [facility].  Family however is pursuing a move to the [facility] near them.  They are looking at a assisted living center near his son.    The patient continues on carbidopa/levodopa 25 100, 1 tablet 3 times a day.  He does not notice any benefit with this.    current medications  Current Outpatient Prescriptions   Medication     amLODIPine (NORVASC) 10 MG tablet     aspirin 81 MG EC tablet     carbidopa-levodopa (SINEMET)  MG per tablet     doxazosin (CARDURA) 8 MG tablet     lisinopril (PRINIVIL/ZESTRIL) 40 MG tablet     metoprolol succinate (TOPROL-XL) 100 MG 24 hr tablet     warfarin (COUMADIN) 5 MG tablet     No current facility-administered medications for this visit.        Examination:  /88  Pulse 84  Temp 98.1  F (36.7  C) (Oral)  Resp 24  Ht 1.778 m (5' 10\")  Wt 96.8 kg (213 lb 4.8 oz)  SpO2 98%  BMI 30.61 kg/m2  General- no distress, no rash, good pulses, no edema  Respiratory-auscultation over the anterior lung fields is clear  Cardiac- regular rate and rhythm    Neurologic  Mental status  Patient is alert, appropriate, speech is fluent and comprehension is intact    Cranial nerve testing  Pupils are equal and reactive to light, visual fields are full to confrontation bilaterally  Extraocular movements are full  Facial sensation is intact, face is symmetric with rest and activation  Palate rises symmetrically, tongue protrudes at midline with normal movements  Sterocleidomastoid and trapezius strength is " normal and symmetric    Motor  Bulk and tone are normal  Strength is 5/5 shoulder abduction, finger abduction, arm flexion and extension, hip flexion, plantar and dorsiflexion    Sensation  Intact to pin, vibration   Proprioception intact in great toes and fingers    Tendon reflexes  Testing at brachioradialis, biceps, triceps, patella and achilles bilaterally showed normal reflexes, symmetrically, without Babinski or Mart signs    Coordination  Finger nose finger testing: normalRapid alternating movements are normal.  Gait and Station: normal

## 2018-06-27 NOTE — NURSING NOTE
Chief Complaint   Patient presents with     RECHECK     [facility]- MOVEMENT DISORDER, PET SCAN RESULTS F/U     Sajan Frances, CMA

## 2018-06-27 NOTE — PATIENT INSTRUCTIONS
Impression:  1.  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 We had a long discussion with the patient and his daughter, Shanna () and his son.  The PET scan is highly suggestive of cortical basal syndrome or some other tau disorder.  He has clinical features of both cortical basal degeneration and progressive supraocular palsy.  2.  His daughter asked about clinical trials.  There is a clinical trial for progressive supraocular palsy.  I do not know if he would qualify.  I will ask Deonna Mooney to give the daughter a call.  3.  We discussed fall prevention.  At present the patient has no swallowing problem.  They asked about long-term plans.  I told him that he can expect to get worse year by year.  I suggested that he begin planning for this.  They have already look for an assisted living situation close to the son's home and I told him this sounded like a good plan.  They asked about driving and I suggested he go get a driving test [facility].  Certainly there is concern about his reaction time.  4.  I suggested that Dr. Linda consider increasing the patient's carbidopa/levodopa.  I have given him initial instructions in this regard.  I recommended that they go up on carbidopa/levodopa to 25/100, 1-1/2 tablets for 2 weeks and if not better go up to 2 tablets 3 times a day.  Admittedly this is unlikely to give great benefit but I think it is worth a try.  If it is not improving him he can go back to 1 tablet 3 times a day.  5.  Should he develop cognitive problems a trial of Rivest take mean her donepezil would be indicated.    The patient will return to Dr. Ashton's excellent care.

## 2018-08-08 ENCOUNTER — OFFICE VISIT (OUTPATIENT)
Dept: NEUROLOGY | Facility: CLINIC | Age: 73
End: 2018-08-08
Payer: COMMERCIAL

## 2018-08-08 VITALS
WEIGHT: 206 LBS | HEART RATE: 72 BPM | DIASTOLIC BLOOD PRESSURE: 92 MMHG | SYSTOLIC BLOOD PRESSURE: 142 MMHG | BODY MASS INDEX: 29.49 KG/M2 | HEIGHT: 70 IN

## 2018-08-08 DIAGNOSIS — G31.85 CORTICOBASAL DEGENERATION (H): Primary | ICD-10-CM

## 2018-08-08 RX ORDER — DOXAZOSIN MESYLATE 8 MG/1
1 TABLET, FILM COATED, EXTENDED RELEASE ORAL AT BEDTIME
Refills: 99 | COMMUNITY
Start: 2018-08-02

## 2018-08-08 RX ORDER — WARFARIN SODIUM 2.5 MG/1
TABLET ORAL
COMMUNITY
Start: 2018-08-06

## 2018-08-08 RX ORDER — POTASSIUM CHLORIDE 1500 MG/1
20 TABLET, EXTENDED RELEASE ORAL 2 TIMES DAILY
COMMUNITY
Start: 2018-08-06

## 2018-08-08 ASSESSMENT — PAIN SCALES - GENERAL: PAINLEVEL: NO PAIN (0)

## 2018-08-08 NOTE — PATIENT INSTRUCTIONS
#1 Decrease the carbidopa/levodopa 25/100 to 1.5 tabs three times a day.  Decrease by 1/2 tablet every 2 weeks until off.  If gait or movement worsen please call me.  #2 Continue Physical therapy for gait stability  #3 Return in 6 months or as needed

## 2018-08-08 NOTE — PROGRESS NOTES
Movement Disorder Clinic follow up note    Patient: Elsy Paul  Medical Record Number: [id number]  Encounter Date: August 8, 2018  PCP:Daron Ye    CC: Cortical basal syndrome    Impression:  1.  Corticobasal syndrome  2.  Frequent falls  3.  Anticoagulated for atrial fibrillation    Recommendations:  1.  The patient's daughter Shanna wonders if the carbidopa levodopa is actually making him worse.  He had escalated to carbidopa/levodopa 25/100, 2 tablets 3 times a day.  He is fallen since getting up to the higher dosage.  We decided to taper him off the medication slowly and see if it is giving him any benefit whatsoever.  He will come off by 1/2 tablet every 2 weeks.  He can start at this time on carbidopa/levodopa 25/100, 1-1/2 tablets 3 times a day.  Every 2 weeks he can come down by another tablet.  If there is no untoward effects he will come completely off the medicine.  If he feels he is worsening off medicine and they will call us.  2.  He should continue physical therapy to avoid falls.  I expressed to him that fall risk is high as he is anticoagulated.  If he continues to fall I will need to contact his doctor and have the anticoagulation reassessed.  I do not know his CHADS-2 score.  It is possible that the risk of anticoagulation and fall falls outweighs the risk of stroke with atrial fibrillation.  3.  He would like to follow here at the [facility].  We will continue to look for possible clinical trials.  I will see him back in 6 months.    Return to clinic: 6 months    Interval Hx:: Mr. 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  He had several falls.  One fall caused him to go into the hospital.  He was discharged to rehabilitation.  He did not like this and continue to fall there.  He is come down and is living in a nursing home in the [facility].  So far he likes it.  He gets physical therapy there.  He still using a cane but they are  "talking about progressing to a 4 wheeled walker.    He did escalate his carbidopa/levodopa.  He went up to 2 tablets 3 times a day.  His daughter Shanna thinks he may be worsening on this.  The escalation corresponds to the.  When he was falling more frequently.    He has no other new symptoms of note.    Current medications    Movement Disorder-related Medications                                                                                                                                                             Current Outpatient Prescriptions   Medication     amLODIPine (NORVASC) 10 MG tablet     aspirin 81 MG EC tablet     carbidopa-levodopa (SINEMET)  MG per tablet     CARDURA XL 8 MG TB24     doxazosin (CARDURA) 8 MG tablet     JANTOVEN 2.5 MG tablet     lisinopril (PRINIVIL/ZESTRIL) 40 MG tablet     metoprolol succinate (TOPROL-XL) 100 MG 24 hr tablet     potassium chloride SA (K-DUR/KLOR-CON M) 20 MEQ CR tablet     warfarin (COUMADIN) 5 MG tablet     No current facility-administered medications for this visit.        Examination:  BP (!) 142/92  Pulse 72  Ht 1.778 m (5' 10\")  Wt 93.4 kg (206 lb)  BMI 29.56 kg/m2  General- no distress, no rash, good pulses, no edema  Respiratory-auscultation over the anterior lung fields is clear  Cardiac- regular rate and rhythm    Neurologic  Mental status  Patient is alert, appropriate, speech is fluent and comprehension is intact    Cranial nerve testing  Pupils are equal and reactive to light, visual fields are full to confrontation bilaterally  Extraocular movements are full  Facial sensation is intact, face is symmetric with rest and activation  Palate rises symmetrically, tongue protrudes at midline with normal movements  Sterocleidomastoid and trapezius strength is normal and symmetric    Motor  He saw  Strength is 5/5 shoulder abduction, finger abduction, arm flexion and extension, hip flexion, plantar and dorsiflexion    Sensation  Intact to pin, " vibration   Proprioception intact in great toes and fingers    Tendon reflexes  Testing at brachioradialis, biceps, triceps, patella and achilles bilaterally showed normal reflexes, symmetrically, without Babinski or Mart signs    Coordination  Finger nose finger testing: normalRapid alternating movements are normal.  Gait and Station: normal    25 minutes of total time was spent face-to-face with the patient over 50% of which was counseling..

## 2018-08-08 NOTE — MR AVS SNAPSHOT
After Visit Summary   8/8/2018    Elsy Paul    MRN: [id number]           Patient Information     Date Of Birth          1945        Visit Information        Provider Department      8/8/2018 5:30 PM Chaz Siu MD [facility] Neurology        Care Instructions    #1 Decrease the carbidopa/levodopa 25/100 to 1.5 tabs three times a day.  Decrease by 1/2 tablet every 2 weeks until off.  If gait or movement worsen please call me.  #2 Continue Physical therapy for gait stability  #3 Return in 6 months or as needed          Follow-ups after your visit        Follow-up notes from your care team     Return in about 6 months (around 2/8/2019).      Your next 10 appointments already scheduled     Feb 06, 2019  1:00 PM CST   (Arrive by 12:45 PM)   Return Movement Disorder with Chaz Siu MD   [facility] Neurology ([facility])    [address] [place] [id number] [phone]              Who to contact     Please call your clinic at [phone] to:    Ask questions about your health    Make or cancel appointments    Discuss your medicines    Learn about your test results    Speak to your doctor            Additional Information About Your Visit        [employer] Information     [employer] gives you secure access to your electronic health record. If you see a primary care provider, you can also send messages to your care team and make appointments. If you have questions, please call your primary care clinic.  If you do not have a primary care provider, please call [phone] and they will assist you.      [employer] is an electronic gateway that provides easy, online access to your medical records. With [employer], you can request a clinic appointment, read your test results, renew a prescription or communicate with your care team.     To access your existing account, please contact your [facility] or call [phone] for  "assistance.        Care EveryWhere ID     This is your Care EveryWhere ID. This could be used by other organizations to access your [facility] medical records  [id number]        Your Vitals Were     Pulse Height BMI (Body Mass Index)             72 1.778 m (5' 10\") 29.56 kg/m2          Blood Pressure from Last 3 Encounters:   08/08/18 (!) 142/92   06/27/18 130/88   06/06/18 112/79    Weight from Last 3 Encounters:   08/08/18 93.4 kg (206 lb)   06/27/18 96.8 kg (213 lb 4.8 oz)   06/06/18 97.5 kg (214 lb 14.4 oz)              Today, you had the following     No orders found for display       Primary Care Provider Office Phone # Fax #    Daron Ye [phone]       [facility] [address] [facility] [address]        Equal Access to Services     [facility] : Hadii raul Turk, waaxda lumiracle, qaybta kaalmada adelilliam, lizy cruz . So [facility] [phone].    ATENCIÓN: Si habla español, tiene a daley disposición servicios gratuitos de asistencia lingüística. Robert al [phone].    We comply with applicable federal civil rights laws and [place] laws. We do not discriminate on the basis of race, color, national origin, age, disability, sex, sexual orientation, or gender identity.            Thank you!     Thank you for choosing [facility] NEUROLOGY  for your care. Our goal is always to provide you with excellent care. Hearing back from our patients is one way we can continue to improve our services. Please take a few minutes to complete the written survey that you may receive in the mail after your visit with us. Thank you!             Your Updated Medication List - Protect others around you: Learn how to safely use, store and throw away your medicines at www.disposemymeds.org.          This list is accurate as of 8/8/18  6:12 PM.  Always use your most recent med list.                   Brand Name Dispense Instructions for use Diagnosis    " amLODIPine 10 MG tablet    NORVASC     Take 10 mg by mouth daily        aspirin 81 MG EC tablet      Take 81 mg by mouth daily        carbidopa-levodopa  MG per tablet    SINEMET          CARDURA XL 8 MG Tb24   Generic drug:  Doxazosin Mesylate      Take 1 tablet by mouth At Bedtime        doxazosin 8 MG tablet    CARDURA     Take 8 mg by mouth        lisinopril 40 MG tablet    PRINIVIL/ZESTRIL     Take 40 mg by mouth daily        metoprolol succinate 100 MG 24 hr tablet    TOPROL-XL     Take 100 mg by mouth        potassium chloride SA 20 MEQ CR tablet    K-DUR/KLOR-CON M          * warfarin 5 MG tablet    COUMADIN     Take 1 tab (5mg) by mouth on Mondays and 0.5 tab (2.5mg) all other days of the week OR as directed by [facility]        * JANTOVEN 2.5 MG tablet   Generic drug:  warfarin           * Notice:  This list has 2 medication(s) that are the same as other medications prescribed for you. Read the directions carefully, and ask your doctor or other care provider to review them with you.

## 2018-08-08 NOTE — LETTER
8/8/2018       RE: Elsy Paul  [address]  [place] [address]     Dear Colleague,    Thank you for referring your patient, Elsy Paul, to the [facility] NEUROLOGY at [facility]. Please see a copy of my visit note below.    Movement Disorder Clinic follow up note    Patient: Elsy Paul  Medical Record Number: [id number]  Encounter Date: August 8, 2018  PCP:Daron Ye    CC: Cortical basal syndrome    Impression:  1.  Corticobasal syndrome  2.  Frequent falls  3.  Anticoagulated for atrial fibrillation    Recommendations:  1.  The patient's daughter Shanna wonders if the carbidopa levodopa is actually making him worse.  He had escalated to carbidopa/levodopa 25/100, 2 tablets 3 times a day.  He is fallen since getting up to the higher dosage.  We decided to taper him off the medication slowly and see if it is giving him any benefit whatsoever.  He will come off by 1/2 tablet every 2 weeks.  He can start at this time on carbidopa/levodopa 25/100, 1-1/2 tablets 3 times a day.  Every 2 weeks he can come down by another tablet.  If there is no untoward effects he will come completely off the medicine.  If he feels he is worsening off medicine and they will call us.  2.  He should continue physical therapy to avoid falls.  I expressed to him that fall risk is high as he is anticoagulated.  If he continues to fall I will need to contact his doctor and have the anticoagulation reassessed.  I do not know his CHADS-2 score.  It is possible that the risk of anticoagulation and fall falls outweighs the risk of stroke with atrial fibrillation.  3.  He would like to follow here at the [facility].  We will continue to look for possible clinical trials.  I will see him back in 6 months.    Return to clinic: 6 months    Interval Hx:: Mr. 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  He had several falls.  One fall caused him to go into the hospital.  He  "was discharged to rehabilitation.  He did not like this and continue to fall there.  He is come down and is living in a nursing home in the [facility] in [place].  So far he likes it.  He gets physical therapy there.  He still using a cane but they are talking about progressing to a 4 wheeled walker.    He did escalate his carbidopa/levodopa.  He went up to 2 tablets 3 times a day.  His daughter Shanna thinks he may be worsening on this.  The escalation corresponds to the.  When he was falling more frequently.    He has no other new symptoms of note.    Current medications    Movement Disorder-related Medications                                                                                                                                                             Current Outpatient Prescriptions   Medication     amLODIPine (NORVASC) 10 MG tablet     aspirin 81 MG EC tablet     carbidopa-levodopa (SINEMET)  MG per tablet     CARDURA XL 8 MG TB24     doxazosin (CARDURA) 8 MG tablet     JANTOVEN 2.5 MG tablet     lisinopril (PRINIVIL/ZESTRIL) 40 MG tablet     metoprolol succinate (TOPROL-XL) 100 MG 24 hr tablet     potassium chloride SA (K-DUR/KLOR-CON M) 20 MEQ CR tablet     warfarin (COUMADIN) 5 MG tablet     No current facility-administered medications for this visit.        Examination:  BP (!) 142/92  Pulse 72  Ht 1.778 m (5' 10\")  Wt 93.4 kg (206 lb)  BMI 29.56 kg/m2  General- no distress, no rash, good pulses, no edema  Respiratory-auscultation over the anterior lung fields is clear  Cardiac- regular rate and rhythm    Neurologic  Mental status  Patient is alert, appropriate, speech is fluent and comprehension is intact    Cranial nerve testing  Pupils are equal and reactive to light, visual fields are full to confrontation bilaterally  Extraocular movements are full  Facial sensation is intact, face is symmetric with rest and activation  Palate rises symmetrically, tongue protrudes at midline with " normal movements  Sterocleidomastoid and trapezius strength is normal and symmetric    Motor  He saw  Strength is 5/5 shoulder abduction, finger abduction, arm flexion and extension, hip flexion, plantar and dorsiflexion    Sensation  Intact to pin, vibration   Proprioception intact in great toes and fingers    Tendon reflexes  Testing at brachioradialis, biceps, triceps, patella and achilles bilaterally showed normal reflexes, symmetrically, without Babinski or Mart signs    Coordination  Finger nose finger testing: normalRapid alternating movements are normal.  Gait and Station: normal    25 minutes of total time was spent face-to-face with the patient over 50% of which was counseling..      Again, thank you for allowing me to participate in the care of your patient.      Sincerely,    Chaz Siu MD

## 2018-08-27 ENCOUNTER — RECORDS - HEALTHEAST (OUTPATIENT)
Dept: LAB | Facility: CLINIC | Age: 73
End: 2018-08-27

## 2018-08-27 LAB — POTASSIUM BLD-SCNC: 3.7 MMOL/L (ref 3.5–5)

## 2018-11-05 ENCOUNTER — RECORDS - HEALTHEAST (OUTPATIENT)
Dept: LAB | Facility: CLINIC | Age: 73
End: 2018-11-05

## 2018-11-05 LAB
ANION GAP SERPL CALCULATED.3IONS-SCNC: 11 MMOL/L (ref 5–18)
BASOPHILS # BLD AUTO: 0 THOU/UL (ref 0–0.2)
BASOPHILS NFR BLD AUTO: 1 % (ref 0–2)
BUN SERPL-MCNC: 14 MG/DL (ref 8–28)
CALCIUM SERPL-MCNC: 9 MG/DL (ref 8.5–10.5)
CHLORIDE BLD-SCNC: 104 MMOL/L (ref 98–107)
CO2 SERPL-SCNC: 24 MMOL/L (ref 22–31)
CREAT SERPL-MCNC: 0.97 MG/DL (ref 0.7–1.3)
EOSINOPHIL # BLD AUTO: 0.1 THOU/UL (ref 0–0.4)
EOSINOPHIL NFR BLD AUTO: 1 % (ref 0–6)
ERYTHROCYTE [DISTWIDTH] IN BLOOD BY AUTOMATED COUNT: 12.9 % (ref 11–14.5)
GFR SERPL CREATININE-BSD FRML MDRD: >60 ML/MIN/1.73M2
GLUCOSE BLD-MCNC: 83 MG/DL (ref 70–125)
HCT VFR BLD AUTO: 41.2 % (ref 40–54)
HGB BLD-MCNC: 14 G/DL (ref 14–18)
LYMPHOCYTES # BLD AUTO: 1 THOU/UL (ref 0.8–4.4)
LYMPHOCYTES NFR BLD AUTO: 18 % (ref 20–40)
MCH RBC QN AUTO: 32.4 PG (ref 27–34)
MCHC RBC AUTO-ENTMCNC: 34 G/DL (ref 32–36)
MCV RBC AUTO: 95 FL (ref 80–100)
MONOCYTES # BLD AUTO: 0.3 THOU/UL (ref 0–0.9)
MONOCYTES NFR BLD AUTO: 6 % (ref 2–10)
NEUTROPHILS # BLD AUTO: 4.1 THOU/UL (ref 2–7.7)
NEUTROPHILS NFR BLD AUTO: 74 % (ref 50–70)
PLATELET # BLD AUTO: 131 THOU/UL (ref 140–440)
PMV BLD AUTO: 10.6 FL (ref 8.5–12.5)
POTASSIUM BLD-SCNC: 3.7 MMOL/L (ref 3.5–5)
RBC # BLD AUTO: 4.32 MILL/UL (ref 4.4–6.2)
SODIUM SERPL-SCNC: 139 MMOL/L (ref 136–145)
WBC: 5.6 THOU/UL (ref 4–11)

## 2018-11-06 LAB — 25(OH)D3 SERPL-MCNC: 24 NG/ML (ref 30–80)

## 2018-11-16 ENCOUNTER — RECORDS - HEALTHEAST (OUTPATIENT)
Dept: LAB | Facility: CLINIC | Age: 73
End: 2018-11-16

## 2018-11-19 LAB — INR PPP: 2.62 (ref 0.9–1.1)

## 2018-11-22 ENCOUNTER — TRANSFERRED RECORDS (OUTPATIENT)
Dept: HEALTH INFORMATION MANAGEMENT | Facility: CLINIC | Age: 73
End: 2018-11-22

## 2018-11-26 ENCOUNTER — TELEPHONE (OUTPATIENT)
Dept: NEUROLOGY | Facility: CLINIC | Age: 73
End: 2018-11-26

## 2018-11-26 ENCOUNTER — TRANSFERRED RECORDS (OUTPATIENT)
Dept: HEALTH INFORMATION MANAGEMENT | Facility: CLINIC | Age: 73
End: 2018-11-26

## 2018-11-26 NOTE — TELEPHONE ENCOUNTER
"Called [facility] and left voice mail with imaging asking them to \"push\" head ct images to [facility] for patient's appointment on Wednesday 11/28 with Dr. Siu.    Called medical records and spoke to \"Jeronimo\". Asked him to fax me the records from the patient's recent emergency department visit. He said he will try to get these to me by fax, before patient's appointment.  "

## 2018-11-28 ENCOUNTER — APPOINTMENT (OUTPATIENT)
Dept: LAB | Facility: CLINIC | Age: 73
End: 2018-11-28
Payer: COMMERCIAL

## 2018-11-28 ENCOUNTER — RECORDS - HEALTHEAST (OUTPATIENT)
Dept: LAB | Facility: CLINIC | Age: 73
End: 2018-11-28

## 2018-11-28 ENCOUNTER — OFFICE VISIT (OUTPATIENT)
Dept: NEUROLOGY | Facility: CLINIC | Age: 73
End: 2018-11-28
Payer: COMMERCIAL

## 2018-11-28 VITALS
OXYGEN SATURATION: 100 % | BODY MASS INDEX: 28.7 KG/M2 | SYSTOLIC BLOOD PRESSURE: 147 MMHG | HEART RATE: 75 BPM | WEIGHT: 200 LBS | DIASTOLIC BLOOD PRESSURE: 96 MMHG

## 2018-11-28 DIAGNOSIS — G31.85 CORTICOBASAL SYNDROME (H): ICD-10-CM

## 2018-11-28 LAB
ALBUMIN UR-MCNC: NEGATIVE MG/DL
APPEARANCE UR: ABNORMAL
BILIRUB UR QL STRIP: NEGATIVE
COLOR UR AUTO: YELLOW
GLUCOSE UR STRIP-MCNC: NEGATIVE MG/DL
HGB UR QL STRIP: NEGATIVE
KETONES UR STRIP-MCNC: NEGATIVE MG/DL
LEUKOCYTE ESTERASE UR QL STRIP: NEGATIVE
MUCOUS THREADS #/AREA URNS LPF: PRESENT /LPF
NITRATE UR QL: NEGATIVE
PH UR STRIP: 5 PH (ref 5–7)
RBC #/AREA URNS AUTO: 2 /HPF (ref 0–2)
SOURCE: ABNORMAL
SP GR UR STRIP: 1.02 (ref 1–1.03)
UROBILINOGEN UR STRIP-MCNC: 2 MG/DL (ref 0–2)
WBC #/AREA URNS AUTO: 1 /HPF (ref 0–5)

## 2018-11-28 RX ORDER — POLYETHYLENE GLYCOL 3350 17 G/17G
17 POWDER, FOR SOLUTION ORAL PRN
Refills: 99 | COMMUNITY
Start: 2018-07-18

## 2018-11-28 RX ORDER — ACETAMINOPHEN 325 MG/1
325-650 TABLET ORAL
COMMUNITY

## 2018-11-28 ASSESSMENT — PAIN SCALES - GENERAL: PAINLEVEL: NO PAIN (0)

## 2018-11-28 NOTE — PROGRESS NOTES
Department of Neurology  Movement Disorders Division     Patient: Elsy Paul   MRN: [id number]   : 1945   Date of Visit: 2018     Chief Complaint: corticobasal syndrome associated with frequent falls    History of Present Illness  Mr. Paul is a 73 year old R handed male that presents to Neurology Movement clinic for follow up regarding management of corticobasal syndrome associated with frequent falls. Patient was last seen on 2018 at which point he was continuing to have falls and was tapered off of CD/LD as family believed this med was making patient more unstable and not helping with falls. He was continued on PT.     History obtained from patient accompanied by daughter and son.  Family reports that patient has began to decline in the past 2 weeks has had more falls.  He fell in  while in the bathroom which resulted in severe retro-orbital edema and ecchymosis.  Patient states that he fell because he lost his balance. He reports having a spinning sensation in his head prior to falling on .  He also shares that he will often feel lightheaded when standing from a sitting position.  He was evaluated in the emergency room and a CT head was performed which did not reveal hemorrhage.  Patient has fallen about 7 times in the past week.  Denies any change to his activities or acute illnesses.  Family states that he will often stumble when backing up and while turning and this may be associated with falls. He continues to live in an assisted living facility and family frequently visits.  He denies dysphasia.  He is currently off of carbidopa levodopa and he reports no difference with being off of this medication.    PMH: recent changes stated above   PSH: unchanged  FH: unchanged  SH: unchanged    Review of Systems:  Other than that mentioned above, the remainder of 12 systems reviewed were negative.    Medications:  Current Outpatient Prescriptions   Medication Sig Dispense  Refill     acetaminophen (TYLENOL) 325 MG tablet Take 325-650 mg by mouth       amLODIPine (NORVASC) 10 MG tablet Take 10 mg by mouth daily       doxazosin (CARDURA) 8 MG tablet Take 8 mg by mouth       lisinopril (PRINIVIL/ZESTRIL) 40 MG tablet Take 40 mg by mouth daily       metoprolol succinate (TOPROL-XL) 100 MG 24 hr tablet Take 100 mg by mouth       polyethylene glycol (MIRALAX/GLYCOLAX) powder MIX 17GM WITH 8OZ FLUID THEN TAKE BY MOUTH DAILY AS NEEDED  99     potassium chloride SA (K-DUR/KLOR-CON M) 20 MEQ CR tablet        aspirin 81 MG EC tablet Take 81 mg by mouth daily       carbidopa-levodopa (SINEMET)  MG per tablet        CARDURA XL 8 MG TB24 Take 1 tablet by mouth At Bedtime  99     JANTOVEN 2.5 MG tablet        warfarin (COUMADIN) 5 MG tablet Take 1 tab (5mg) by mouth on Mondays and 0.5 tab (2.5mg) all other days of the week OR as directed by [facility]           Physical Exam:  The patient's  weight is 90.7 kg (200 lb). His blood pressure is 147/96 (abnormal) and his pulse is 75. His oxygen saturation is 100%.    Physical Exam:  Gen: alert, active, attentive, appropriately groomed   HEENT: normocephalic, eyes open with no discharge, nares patent, oropharynx clear-no lesions, retro-orbital ecchymosis and edema (L > R), fading subconjunctival hemorrhage in L eye, anterior neck ecchymosis  Chest: normal configuration, regular rate and rhythm, chest rise equal b/l, non labored breathing   Extremities: no clubbing/edema/cyanosis in BUE/BLE, distal pulses intact, several healing scabs on LUE  Psych: mood stable     Neurologic Exam:  Mental status: Alert and oriented to person, place, time, and situation. Follows commands. Recent and remote memory intact. Attention span and concentration intact. Fund of knowledge intact to current events.   Speech: Fluent, intact comprehension and articulation, hypophonic, bradyphrenic  CN: visual fields intact in all fields, slightly restricted up  gaze and downgaze with saccadic intrusions and nonsustained nystagmus at lateral gaze, square wave jerks, PERRL, facial sensation intact, facial movement symmetric, hypomimic, hearing grossly intact to conversation, tongue protrudes midline   Motor: Moves all extremities equally against gravity without difficulty or abnormalities with 5/5 strength throughout, increased tone in LUE/LLE, apraxia in LUE/LLE, unable to perform Luria test on L, difficulty with finger taps/Cristal/toe taps on L,finger taps/Cristal/toe taps bradykinetic on R, no neglect on BUE/BLE,    Sensation: intact to light touch throughout   Coordination: grossly intact with FTN, HTS on R. Able to perform FTN, HTS on L but with some difficulty when asked to perform these movement, however able to perform independently (such as crossing legs).  Gait: unable to rise unassisted from a seated position and requires 1-2 person assist, no arm swing and shuffling gait, requires 1 person assist with ambulation, no en bloc turns, no ataxia    Data Reviewed:   - 11/22/2018 CT H without evidence of intracranial hemorrhage. There is diffuse cerebral atrophy, L > R.     Impression:  Mr. Paul is a 73 year old R handed male that presents to Neurology Movement clinic for follow up regarding management of corticobasal syndrome associated with frequent falls.     1. Corticobasal syndrome: Patient's concerns continue to be frequent falls. He was trialed on CD/LD without benefit thus was stopped. He has had apparent worsening of falls in the past two week prior to this clinic visit. Patient fell on 11/22/2018 resulting in retro-orbital edema and ecchymosis, L > R. CT H thankfully did not reveal intracranial hemorrhage. Patient reports a transient sensation of room spinning and also has dizziness upon standing. Considering patient's acute worsening, suspect a possible infectious process such as UTI or an inner ear infection (presumably viral). Also on the differential is benign  positional vertigo versus orthostatic hypotension. Although we are certain that the patient's condition will continue to progress, we would not expect it to progress this quickly. Thus, will evaluate for an infectious process.   2. Frequent falls associated to #1    Plan:   > UA  > We recommend close monitoring for 2 weeks including 24 hour assist and up with assistance only. If assisted living facility is unable to provide this, he may benefit from a transient stay at a transitional care facility or 24 hour in-home care.  > Vestibular therapy consult with Katie Black in PT to be scheduled at next clinic visit on 12/11/2018    RTC: 12/11/2018     Merlene Hernandez,   Movement Disorders Fellow    Patient seen and discussed with Dr. Siu.     I, Chaz Siu, personally interviewed, examined and evaluated this patient on 11/28/2018.  I discussed the patient with Dr. Merlene Hernandez and agree with the assessment, examination and plan of care documented by Dr. Hernandez.  I personally reviewed the vital signs, medications and labs/imaging.

## 2018-11-28 NOTE — MR AVS SNAPSHOT
After Visit Summary   11/28/2018    Elsy Paul    MRN: [id number]           Patient Information     Date Of Birth          1945        Visit Information        Provider Department      11/28/2018 2:30 PM Chaz Siu MD [facility] Neurology        Today's Diagnoses     Corticobasal syndrome          Care Instructions    Plan:  - We recommend 24 hour assist and up with assistance only.  - UA today to evaluate for UTI.  - Will order to vestibular therapy when he is seen back in 2 weeks.    We will see you back in 2 weeks.           Follow-ups after your visit        Additional Services     PHYSICAL THERAPY REFERRAL       If you have not heard from the scheduling office within 2 business days, please call [phone] for all locations, with the exception of [place], please call [phone] and [facility] [place], please call [phone].    Please be aware that coverage of these services is subject to the terms and limitations of your health insurance plan.  Call member services at your health plan with any benefit or coverage questions.                  Follow-up notes from your care team     Return in 2 weeks (on 12/11/2018).      Your next 10 appointments already scheduled     Dec 11, 2018  1:00 PM CST   (Arrive by 12:45 PM)   Return Movement Disorder with Chaz Siu MD   [facility] Neurology ([facility])    [address] [place] [id number] [phone]            Feb 06, 2019  1:00 PM CST   (Arrive by 12:45 PM)   Return Movement Disorder with Chaz Siu MD   [facility] Neurology ([facility])    [address] [place] [id number] [phone]              Future tests that were ordered for you today     Open Future Orders        Priority Expected Expires Ordered    PHYSICAL THERAPY REFERRAL Routine  11/28/2019 11/28/2018            Who to contact     Please call your clinic at  [phone] to:    Ask questions about your health    Make or cancel appointments    Discuss your medicines    Learn about your test results    Speak to your doctor            Additional Information About Your Visit        [employer] Information     [employer] gives you secure access to your electronic health record. If you see a primary care provider, you can also send messages to your care team and make appointments. If you have questions, please call your primary care clinic.  If you do not have a primary care provider, please call [phone] and they will assist you.      [employer] is an electronic gateway that provides easy, online access to your medical records. With [employer], you can request a clinic appointment, read your test results, renew a prescription or communicate with your care team.     To access your existing account, please contact your [facility] or call [phone] for assistance.        Care EveryWhere ID     This is your Care EveryWhere ID. This could be used by other organizations to access your [facility] medical records  [id number]        Your Vitals Were     Pulse Pulse Oximetry BMI (Body Mass Index)             75 100% 28.7 kg/m2          Blood Pressure from Last 3 Encounters:   11/28/18 (!) 147/96   08/08/18 (!) 142/92   06/27/18 130/88    Weight from Last 3 Encounters:   11/28/18 90.7 kg (200 lb)   08/08/18 93.4 kg (206 lb)   06/27/18 96.8 kg (213 lb 4.8 oz)              We Performed the Following     UA reflex to Microscopic and Culture        Primary Care Provider Office Phone # Fax #    Daron YAP Cassi [phone]       [facility] [address] [facility] [address]        Equal Access to Services     [facility] : Dominick Turk, klaus lumiracle, john scottallizy brannon. So [facility] [phone].    ATENCIÓN: Si habla español, tiene a daley disposición servicios gratuitos de  elana lingüística. Robert al [phone].    We comply with applicable federal civil rights laws and [place] laws. We do not discriminate on the basis of race, color, national origin, age, disability, sex, sexual orientation, or gender identity.            Thank you!     Thank you for choosing [facility] NEUROLOGY  for your care. Our goal is always to provide you with excellent care. Hearing back from our patients is one way we can continue to improve our services. Please take a few minutes to complete the written survey that you may receive in the mail after your visit with us. Thank you!             Your Updated Medication List - Protect others around you: Learn how to safely use, store and throw away your medicines at www.disposemymeds.org.          This list is accurate as of 11/28/18  3:48 PM.  Always use your most recent med list.                   Brand Name Dispense Instructions for use Diagnosis    acetaminophen 325 MG tablet    TYLENOL     Take 325-650 mg by mouth        amLODIPine 10 MG tablet    NORVASC     Take 10 mg by mouth daily        aspirin 81 MG EC tablet      Take 81 mg by mouth daily        carbidopa-levodopa  MG per tablet    SINEMET          CARDURA XL 8 MG 24 HR ER tablet   Generic drug:  doxazosin mesylate ER      Take 1 tablet by mouth At Bedtime        doxazosin 8 MG tablet    CARDURA     Take 8 mg by mouth        lisinopril 40 MG tablet    PRINIVIL/ZESTRIL     Take 40 mg by mouth daily        metoprolol succinate 100 MG 24 hr tablet    TOPROL-XL     Take 100 mg by mouth        polyethylene glycol powder    MIRALAX/GLYCOLAX     MIX 17GM WITH 8OZ FLUID THEN TAKE BY MOUTH DAILY AS NEEDED        potassium chloride ER 20 MEQ CR tablet    K-DUR/KLOR-CON M          * warfarin 5 MG tablet    COUMADIN     Take 1 tab (5mg) by mouth on Mondays and 0.5 tab (2.5mg) all other days of the week OR as directed by [facility]        * JANTOVEN 2.5 MG tablet   Generic drug:   warfarin           * Notice:  This list has 2 medication(s) that are the same as other medications prescribed for you. Read the directions carefully, and ask your doctor or other care provider to review them with you.

## 2018-11-28 NOTE — LETTER
2018       RE: Elsy Paul  [address]  [place] [address]     Dear Colleague,    Thank you for referring your patient, Elsy Paul, to the [facility] NEUROLOGY at [facility]. Please see a copy of my visit note below.    Department of Neurology  Movement Disorders Division     Patient: Elsy Paul   MRN: [id number]   : 1945   Date of Visit: 2018     Chief Complaint: corticobasal syndrome associated with frequent falls    History of Present Illness  Mr. Paul is a 73 year old R handed male that presents to Neurology Movement clinic for follow up regarding management of corticobasal syndrome associated with frequent falls. Patient was last seen on 2018 at which point he was continuing to have falls and was tapered off of CD/LD as family believed this med was making patient more unstable and not helping with falls. He was continued on PT.     History obtained from patient accompanied by daughter and son.  Family reports that patient has began to decline in the past 2 weeks has had more falls.  He fell in  while in the bathroom which resulted in severe retro-orbital edema and ecchymosis.  Patient states that he fell because he lost his balance. He reports having a spinning sensation in his head prior to falling on .  He also shares that he will often feel lightheaded when standing from a sitting position.  He was evaluated in the emergency room and a CT head was performed which did not reveal hemorrhage.  Patient has fallen about 7 times in the past week.  Denies any change to his activities or acute illnesses.  Family states that he will often stumble when backing up and while turning and this may be associated with falls. He continues to live in an assisted living facility and family frequently visits.  He denies dysphasia.  He is currently off of carbidopa levodopa and he reports no difference with being off of this medication.    PMH:  recent changes stated above   PSH: unchanged  FH: unchanged  SH: unchanged    Review of Systems:  Other than that mentioned above, the remainder of 12 systems reviewed were negative.    Medications:  Current Outpatient Prescriptions   Medication Sig Dispense Refill     acetaminophen (TYLENOL) 325 MG tablet Take 325-650 mg by mouth       amLODIPine (NORVASC) 10 MG tablet Take 10 mg by mouth daily       doxazosin (CARDURA) 8 MG tablet Take 8 mg by mouth       lisinopril (PRINIVIL/ZESTRIL) 40 MG tablet Take 40 mg by mouth daily       metoprolol succinate (TOPROL-XL) 100 MG 24 hr tablet Take 100 mg by mouth       polyethylene glycol (MIRALAX/GLYCOLAX) powder MIX 17GM WITH 8OZ FLUID THEN TAKE BY MOUTH DAILY AS NEEDED  99     potassium chloride SA (K-DUR/KLOR-CON M) 20 MEQ CR tablet        aspirin 81 MG EC tablet Take 81 mg by mouth daily       carbidopa-levodopa (SINEMET)  MG per tablet        CARDURA XL 8 MG TB24 Take 1 tablet by mouth At Bedtime  99     JANTOVEN 2.5 MG tablet        warfarin (COUMADIN) 5 MG tablet Take 1 tab (5mg) by mouth on Mondays and 0.5 tab (2.5mg) all other days of the week OR as directed by [facility]           Physical Exam:  The patient's  weight is 90.7 kg (200 lb). His blood pressure is 147/96 (abnormal) and his pulse is 75. His oxygen saturation is 100%.    Physical Exam:  Gen: alert, active, attentive, appropriately groomed   HEENT: normocephalic, eyes open with no discharge, nares patent, oropharynx clear-no lesions, retro-orbital ecchymosis and edema (L > R), fading subconjunctival hemorrhage in L eye, anterior neck ecchymosis  Chest: normal configuration, regular rate and rhythm, chest rise equal b/l, non labored breathing   Extremities: no clubbing/edema/cyanosis in BUE/BLE, distal pulses intact, several healing scabs on LUE  Psych: mood stable     Neurologic Exam:  Mental status: Alert and oriented to person, place, time, and situation. Follows commands. Recent  and remote memory intact. Attention span and concentration intact. Fund of knowledge intact to current events.   Speech: Fluent, intact comprehension and articulation, hypophonic, bradyphrenic  CN: visual fields intact in all fields, slightly restricted up gaze and downgaze with saccadic intrusions and nonsustained nystagmus at lateral gaze, square wave jerks, PERRL, facial sensation intact, facial movement symmetric, hypomimic, hearing grossly intact to conversation, tongue protrudes midline   Motor: Moves all extremities equally against gravity without difficulty or abnormalities with 5/5 strength throughout, increased tone in LUE/LLE, apraxia in LUE/LLE, unable to perform Luria test on L, difficulty with finger taps/Cristal/toe taps on L,finger taps/Cristal/toe taps bradykinetic on R, no neglect on BUE/BLE,    Sensation: intact to light touch throughout   Coordination: grossly intact with FTN, HTS on R. Able to perform FTN, HTS on L but with some difficulty when asked to perform these movement, however able to perform independently (such as crossing legs).  Gait: unable to rise unassisted from a seated position and requires 1-2 person assist, no arm swing and shuffling gait, requires 1 person assist with ambulation, no en bloc turns, no ataxia    Data Reviewed:   - 11/22/2018 CT H without evidence of intracranial hemorrhage. There is diffuse cerebral atrophy, L > R.     Impression:  Mr. Paul is a 73 year old R handed male that presents to Neurology Movement clinic for follow up regarding management of corticobasal syndrome associated with frequent falls.     1. Corticobasal syndrome: Patient's concerns continue to be frequent falls. He was trialed on CD/LD without benefit thus was stopped. He has had apparent worsening of falls in the past two week prior to this clinic visit. Patient fell on 11/22/2018 resulting in retro-orbital edema and ecchymosis, L > R. CT H thankfully did not reveal intracranial hemorrhage.  Patient reports a transient sensation of room spinning and also has dizziness upon standing. Considering patient's acute worsening, suspect a possible infectious process such as UTI or an inner ear infection (presumably viral). Also on the differential is benign positional vertigo versus orthostatic hypotension. Although we are certain that the patient's condition will continue to progress, we would not expect it to progress this quickly. Thus, will evaluate for an infectious process.   2. Frequent falls associated to #1    Plan:   > UA  > We recommend close monitoring for 2 weeks including 24 hour assist and up with assistance only. If assisted living facility is unable to provide this, he may benefit from a transient stay at a transitional care facility or 24 hour in-home care.  > Vestibular therapy consult with Katie Black in PT to be scheduled at next clinic visit on 12/11/2018    RTC: 12/11/2018     Merlene Hernandez DO  Movement Disorders Fellow    Patient seen and discussed with Dr. Siu.     I, Chaz Siu, personally interviewed, examined and evaluated this patient on 11/28/2018.  I discussed the patient with Dr. Merlene Hernandez and agree with the assessment, examination and plan of care documented by Dr. Hernandez.  I personally reviewed the vital signs, medications and labs/imaging.    Again, thank you for allowing me to participate in the care of your patient.      Sincerely,    Chaz Siu MD

## 2018-11-28 NOTE — NURSING NOTE
Chief Complaint   Patient presents with     RECHECK     [facility] RETURN MOVEMENT DISORDER       Yaneli Rdz, EMT

## 2018-11-28 NOTE — PATIENT INSTRUCTIONS
Plan:  - We recommend 24 hour assist and up with assistance only.  - UA today to evaluate for UTI.  - Will order to vestibular therapy when he is seen back in 2 weeks.    We will see you back in 2 weeks.

## 2018-11-29 LAB — POTASSIUM BLD-SCNC: 3.6 MMOL/L (ref 3.5–5)

## 2018-12-11 ENCOUNTER — OFFICE VISIT (OUTPATIENT)
Dept: NEUROLOGY | Facility: CLINIC | Age: 73
End: 2018-12-11
Payer: COMMERCIAL

## 2018-12-11 VITALS
WEIGHT: 206 LBS | HEART RATE: 67 BPM | HEIGHT: 70 IN | BODY MASS INDEX: 29.49 KG/M2 | OXYGEN SATURATION: 97 % | SYSTOLIC BLOOD PRESSURE: 130 MMHG | DIASTOLIC BLOOD PRESSURE: 91 MMHG

## 2018-12-11 DIAGNOSIS — G31.85 CORTICOBASAL DEGENERATION (H): ICD-10-CM

## 2018-12-11 DIAGNOSIS — R42 VERTIGO: Primary | ICD-10-CM

## 2018-12-11 ASSESSMENT — PAIN SCALES - GENERAL: PAINLEVEL: NO PAIN (0)

## 2018-12-11 ASSESSMENT — MIFFLIN-ST. JEOR: SCORE: 1675.66

## 2018-12-11 NOTE — PROGRESS NOTES
Movement Disorder Clinic follow up note    Patient: Elsy Paul  Medical Record Number: [id number]  Encounter Date: December 11, 2018  PCP:Daron Ye    CC: Cortical basal degeneration    Impression:  1.  Cortical basal degeneration  2.  Frequent falls    Recommendations:  1.  The patient has cortical basal degeneration and likely some of his worsening balance reflects progression of his disease.  2.  Despite his fall risk he does need to get up and walk under close supervision at least twice a day as he is developing deconditioning and disuse atrophy.  3.  He has vertigo when lying down.  This could be benign positional vertigo.  I doubt however that it has anything to do with his worsening balance.  Nevertheless I think it would be good for him to see 1 of our vestibular physical therapist to see if they think he has BPV and to see have canalith repositioning could help him with his vertigo.  4.  I will see him back in February    Return to clinic: February 2019    Interval Hx:: Mr. 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  At last visit he was having frequent dangerous falls.  He fell with facial trauma.  It was unclear why he was worsening so rapidly.  We felt that he may have had a viral infection or perhaps a vestibulopathy making him worse.  We checked for a urinary tract infection but this was negative.  He states that he has episodes of vertigo with the room is spinning.  It happens when he is been standing and lies down.  He does not get the vertigo when he is walking so it is unlikely to be associated with his increasing balance problems.    At his assisted living home called the pharmacy that they are taking close care of him.  He spends most of his time in a wheelchair.  They are very careful not to let him fall.  Occasionally they will get him up and walk with assistance.  However he is feeling weaker and weaker.    On examination the only area of  "manual muscle testing weakness  his in his iliopsoas muscles bilaterally.  I suspect that this is disuse atrophy or deconditioning.  Otherwise he is strong.  He can walk a few paces but is unsteady in terms of his postural reflexes.  I do not see any nystagmus.  Thalia-Hallpike maneuver is not attempted.    Current medications    Movement Disorder-related Medications                                                                                                                                                             Current Outpatient Medications   Medication     acetaminophen (TYLENOL) 325 MG tablet     amLODIPine (NORVASC) 10 MG tablet     aspirin 81 MG EC tablet     carbidopa-levodopa (SINEMET)  MG per tablet     CARDURA XL 8 MG TB24     doxazosin (CARDURA) 8 MG tablet     JANTOVEN 2.5 MG tablet     lisinopril (PRINIVIL/ZESTRIL) 40 MG tablet     metoprolol succinate (TOPROL-XL) 100 MG 24 hr tablet     polyethylene glycol (MIRALAX/GLYCOLAX) powder     potassium chloride SA (K-DUR/KLOR-CON M) 20 MEQ CR tablet     warfarin (COUMADIN) 5 MG tablet     No current facility-administered medications for this visit.        Examination:  BP (!) 130/91 (BP Location: Right arm)   Pulse 67   Ht 1.778 m (5' 10\")   Wt 93.4 kg (206 lb)   SpO2 97%   BMI 29.56 kg/m    General- no distress, no rash, good pulses, no edema  Respiratory-auscultation over the anterior lung fields is clear  Cardiac- regular rate and rhythm    Neurologic  Mental status  Patient is alert, appropriate, speech is fluent and comprehension is intact    Cranial nerve testing  Pupils are equal and reactive to light, visual fields are full to confrontation bilaterally  Extraocular movements are full  Facial sensation is intact, face is symmetric with rest and activation  Palate rises symmetrically, tongue protrudes at midline with normal movements  Sterocleidomastoid and trapezius strength is normal and symmetric    Motor  Bulk and tone are " normal  Strength is 5/5 shoulder abduction, finger abduction, arm flexion and extension, hip flexion, plantar and dorsiflexion    Sensation  Intact to pin, vibration   Proprioception intact in great toes and fingers    Tendon reflexes  Testing at brachioradialis, biceps, triceps, patella and achilles bilaterally showed normal reflexes, symmetrically, without Babinski or Mart signs    Coordination  Finger nose finger testing: normalRapid alternating movements are normal.  Gait and Station: normal    15 minutes of total time was spent face-to-face with the patient over 50% of which was counseling..

## 2018-12-11 NOTE — PATIENT INSTRUCTIONS
1.  Please walk at least twice daily with  assistance at least 200 ft. each session  2.  Vestibular evaluation in PT at [facility]

## 2018-12-11 NOTE — LETTER
12/11/2018       RE: Elsy Paul  [address]  [place] [address]     Dear Colleague,    Thank you for referring your patient, Elsy Paul, to the [facility] NEUROLOGY at [facility]. Please see a copy of my visit note below.    Movement Disorder Clinic follow up note    Patient: Elsy Paul  Medical Record Number: [id number]  Encounter Date: December 11, 2018  PCP:Daron Ye    CC: Cortical basal degeneration    Impression:  1.  Cortical basal degeneration  2.  Frequent falls    Recommendations:  1.  The patient has cortical basal degeneration and likely some of his worsening balance reflects progression of his disease.  2.  Despite his fall risk he does need to get up and walk under close supervision at least twice a day as he is developing deconditioning and disuse atrophy.  3.  He has vertigo when lying down.  This could be benign positional vertigo.  I doubt however that it has anything to do with his worsening balance.  Nevertheless I think it would be good for him to see 1 of our vestibular physical therapist to see if they think he has BPV and to see have canalith repositioning could help him with his vertigo.  4.  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  At last visit he was having frequent dangerous falls.  He fell with facial trauma.  It was unclear why he was worsening so rapidly.  We felt that he may have had a viral infection or perhaps a vestibulopathy making him worse.  We checked for a urinary tract infection but this was negative.  He states that he has episodes of vertigo with the room is spinning.  It happens when he is been standing and lies down.  He does not get the vertigo when he is walking so it is unlikely to be associated with his increasing balance problems.    At his assisted living home called the pharmacy that they  "are taking close care of him.  He spends most of his time in a wheelchair.  They are very careful not to let him fall.  Occasionally they will get him up and walk with assistance.  However he is feeling weaker and weaker.    On examination the only area of manual muscle testing weakness  his in his iliopsoas muscles bilaterally.  I suspect that this is disuse atrophy or deconditioning.  Otherwise he is strong.  He can walk a few paces but is unsteady in terms of his postural reflexes.  I do not see any nystagmus.  Thalia-Hallpike maneuver is not attempted.    Current medications    Movement Disorder-related Medications                                                                                                                                                             Current Outpatient Medications   Medication     acetaminophen (TYLENOL) 325 MG tablet     amLODIPine (NORVASC) 10 MG tablet     aspirin 81 MG EC tablet     carbidopa-levodopa (SINEMET)  MG per tablet     CARDURA XL 8 MG TB24     doxazosin (CARDURA) 8 MG tablet     JANTOVEN 2.5 MG tablet     lisinopril (PRINIVIL/ZESTRIL) 40 MG tablet     metoprolol succinate (TOPROL-XL) 100 MG 24 hr tablet     polyethylene glycol (MIRALAX/GLYCOLAX) powder     potassium chloride SA (K-DUR/KLOR-CON M) 20 MEQ CR tablet     warfarin (COUMADIN) 5 MG tablet     No current facility-administered medications for this visit.        Examination:  BP (!) 130/91 (BP Location: Right arm)   Pulse 67   Ht 1.778 m (5' 10\")   Wt 93.4 kg (206 lb)   SpO2 97%   BMI 29.56 kg/m     General- no distress, no rash, good pulses, no edema  Respiratory-auscultation over the anterior lung fields is clear  Cardiac- regular rate and rhythm    Neurologic  Mental status  Patient is alert, appropriate, speech is fluent and comprehension is intact    Cranial nerve testing  Pupils are equal and reactive to light, visual fields are full to confrontation bilaterally  Extraocular movements are " full  Facial sensation is intact, face is symmetric with rest and activation  Palate rises symmetrically, tongue protrudes at midline with normal movements  Sterocleidomastoid and trapezius strength is normal and symmetric    Motor  Bulk and tone are normal  Strength is 5/5 shoulder abduction, finger abduction, arm flexion and extension, hip flexion, plantar and dorsiflexion    Sensation  Intact to pin, vibration   Proprioception intact in great toes and fingers    Tendon reflexes  Testing at brachioradialis, biceps, triceps, patella and achilles bilaterally showed normal reflexes, symmetrically, without Babinski or Mart signs    Coordination  Finger nose finger testing: normalRapid alternating movements are normal.  Gait and Station: normal    15 minutes of total time was spent face-to-face with the patient over 50% of which was counseling..      Again, thank you for allowing me to participate in the care of your patient.      Sincerely,    Chaz Siu MD

## 2019-01-14 ENCOUNTER — HOSPITAL ENCOUNTER (OUTPATIENT)
Dept: PHYSICAL THERAPY | Facility: CLINIC | Age: 74
Setting detail: THERAPIES SERIES
End: 2019-01-14
Attending: PSYCHIATRY & NEUROLOGY
Payer: MEDICARE

## 2019-01-14 DIAGNOSIS — R42 VERTIGO: ICD-10-CM

## 2019-01-14 PROCEDURE — 97162 PT EVAL MOD COMPLEX 30 MIN: CPT | Mod: GP | Performed by: PHYSICAL THERAPIST

## 2019-01-14 PROCEDURE — 95992 CANALITH REPOSITIONING PROC: CPT | Mod: GP | Performed by: PHYSICAL THERAPIST

## 2019-01-15 NOTE — PROGRESS NOTES
[facility]      Outpatient Physical Therapy Evaluation  PLAN OF TREATMENT FOR OUTPATIENT REHABILITATION  (COMPLETE FOR INITIAL CLAIMS ONLY)  Patient's Last Name, First Name, M.I.  YOB: 1945  Elsy Paul                        Provider's Name  [facility] Medical Record No.  [id number]                               Onset Date:  Order date 12/11/2018   Start of Care Date: 1/14/2019     Type: Physical Therapy Medical Diagnosis: vertigo/BPPV                        Therapy Diagnosis:  BPPV/vertigo, falls, posture, weakness, and limited activity tolerance   Visits from SOC:  1   _________________________________________________________________________________  Plan of Treatment:      Frequency/Duration: weekly for up to 3 months    Goals: decrease dizziness/vertigo, improve posture and mobility  _________________________________________________________________________________    I CERTIFY THE NEED FOR THESE SERVICES FURNISHED UNDER        THIS PLAN OF TREATMENT AND WHILE UNDER MY CARE     (Physician co-signature of this document indicates review and certification of the therapy plan).                Certification date from: 1/14/2019  Certification date to: 4/13/2019    Referring Provider: DR Siu

## 2019-01-15 NOTE — PROGRESS NOTES
01/14/19 1300   General Information   Start of Care Date 01/14/19   Referring Physician DR Siu   Orders Evaluate and Treat as Indicated   Order Date 12/11/18   Medical Diagnosis vertigo  (corticobasal degeneration)   Onset of illness/injury or Date of Surgery (dx'd in mid 2018 but symptoms for over a year)   Surgical/Medical history reviewed Yes   Pertinent history of current problem (include personal factors and/or comorbidities that impact the POC) Falling. Thinks the falls started in October/November. Prior to that was walking IND with AD in the bldg to fitness room etc. Bad fall the night before Thanksgiving with facial trauma. Other falls after that so now having assistance anytime he gets up. Wears a pendant. Dizziness has been going since he moved to the assisted living and possible getting better over times.    Pertinent Visual History  glasses for reading   Prior level of function comment walking with staff 200 feet with 4WW daily for ex for the past 3 weeks.    Previous/Current Treatment Physical Therapy;Other   Improvement after PT Mild   Other treatment He was having home care PT 1-2 times a week for ?? a month. working on transfers and gait.    Current Community Support Family/friend caregiver;Emergency call system;Other/Comments   Patient role/Employment history Retired   Living environment Assisted living   Current Assistive Devices Four Wheeled Walker   Fall Risk Screen   Fall screen completed by PT   Have you fallen 2 or more times in the past year? Yes   Have you fallen and had an injury in the past year? Yes   Is patient a fall risk? Yes   Pain   Patient currently in pain No   Posture   Posture Forward head position;Protracted shoulders;Kyphosis   Posture Comments Really stiff entire trunk and neck.    Range of Motion (ROM)   ROM Comment Limited CROM for rotation/sidebend. Neck extension to nuetral only seated but improves in supine for positional testing. left arm AROM about 140 degrees  for shdr flexion, not full elbow extension and limied supination/pronation. right arm WFLS.   Transfer Skills   Transfer Comments sit to stand he has to use his hands on the arm rests and have CGA to González depending upone fatigue and ht of the chair.    Locomotion   Wheel Chair Mobility Impaired   Wheel Chair Mobility Comments Deendent.   Gait   Gait Comments Amb with 4WW 10 feet twice with CGA. small steps and flexed posture   Sensory Examination   Sensory Perception no deficits were identified   Sensory Perception Comments denies n/t   Infrared Goggle Exam or Frenzel Lense Exam   Vestibular Suppressant in Last 24 Hours? No   Exam completed with Infrared Goggles   Spontaneous Nystagmus Other   Spontaneous Nystagmus comments lots of Square wave jerks, at times eyes are still and other times lots of SWJ's   Gaze Evoked Nystagmus Horizontal R;Horizontal L;Other   Gaze Evoked Nystagmus comments Very limited eye movement horizontal. There is some right beating and some left beating. Vertical has a bit more eye motion.   Head Shake Horizontal Nystagmus comments I attempted this. Neck is stiff with limited ROM.    Positional Testing comments Two people to get him into long sitting and positioned on the bed for testing. Two of us are needed for positional testing. Pillows under thoracic spine to help with gettign neck extension.   Sheffield-Hallpike (right) Horizontal L;Other   Thalia-Hallpike (right) comments there is SWJ's and some horizontal beating nystagmus. He feels spinnning sensation that does decrease over 30 seconds. REturn to vertical also feels dizziness.   Sheffield-Hallpike (Left) Horizontal R;Other   Sheffield-Hallpike (left) comments There is SWJs and horizontal beating nystagmus. Reprots spinning sensation again but not as much as right side   [facility] Supine Roll Test (Right) Other   [facility] Supine Roll Test (Left) Other   Supine Neck Extension Test Other   Supine Neck Extension Test Comments SWJ's??? and reports some vertigo but  when we came back to upright then he had clear down beating and oblique nystagmus. I had to physically support him or he would have fallen over.    BPPV Canal(s) L Posterior;R Posterior  (treated today for right side BPPV)   BPPV Type Canalithasis   Clinical Impression   Criteria for Skilled Therapeutic Interventions Met yes, treatment indicated   PT Diagnosis BPPV, possible bilaterally and Falls. Corticobasal degeneration   Influenced by the following impairments falls/dynamic postural control, gait speed, vertigo/dizziness, posture, limited cervical ROM, decreased left arm ROM, trunk stiffness, and limited activity tolerance.    Functional limitations due to impairments affects ADLS/IADLS, household/community mobility   Clinical Presentation Evolving/Changing   Clinical Presentation Rationale based on chronic/complex medical history, gait speed/gait control, positional ltokdcw-jrxljrepl-ghshqfol both sides vs central.  all the impairments   Clinical Decision Making (Complexity) Moderate complexity   Therapy Frequency 1 time/week   Predicted Duration of Therapy Intervention (days/wks) 3 months   Risk & Benefits of therapy have been explained Yes   Patient, Family & other staff in agreement with plan of care Yes   Clinical Impression Comments Positional testing today was not clear if its bilateral BPPV or central vertigo. Unusual nystagmus vs square wave jerks in all positions. Treated today with 2 right CRPS. Next appt we will try left CRPS.  He needs a lot of posture (cervical/trunk/arms/legs) and balance work. He was moving IND 6 months ago with a walker.    Goal 1   Goal Identifier posture   Goal Description He will be able to work on his seated and standing posture IND to improve his mobility/balance   Target Date 03/14/19   Goal 2   Goal Identifier Dizziness/Vertigo   Goal Description Improve pts complaints of dizziness/vertigo with position changes (in/out of bed).    Target Date 04/14/19   Total Evaluation  Time   PT Eval, Moderate Complexity Minutes (60222) 40   Katie Wayne DPT, MPT, NCS

## 2019-01-17 DIAGNOSIS — R42 VERTIGO: ICD-10-CM

## 2019-01-17 DIAGNOSIS — H81.10 BENIGN PAROXYSMAL POSITIONAL VERTIGO, UNSPECIFIED LATERALITY: Primary | ICD-10-CM

## 2019-01-24 ENCOUNTER — HOSPITAL ENCOUNTER (OUTPATIENT)
Dept: PHYSICAL THERAPY | Facility: CLINIC | Age: 74
Setting detail: THERAPIES SERIES
End: 2019-01-24
Attending: PSYCHIATRY & NEUROLOGY
Payer: MEDICARE

## 2019-01-24 DIAGNOSIS — H81.10 BENIGN PAROXYSMAL POSITIONAL VERTIGO, UNSPECIFIED LATERALITY: ICD-10-CM

## 2019-01-24 DIAGNOSIS — R42 VERTIGO: ICD-10-CM

## 2019-01-24 PROCEDURE — 95992 CANALITH REPOSITIONING PROC: CPT | Mod: GP | Performed by: PHYSICAL THERAPIST

## 2019-01-24 PROCEDURE — 97116 GAIT TRAINING THERAPY: CPT | Mod: GP | Performed by: PHYSICAL THERAPIST

## 2019-01-24 PROCEDURE — 97112 NEUROMUSCULAR REEDUCATION: CPT | Mod: GP | Performed by: PHYSICAL THERAPIST

## 2019-02-06 ENCOUNTER — HOSPITAL ENCOUNTER (OUTPATIENT)
Dept: PHYSICAL THERAPY | Facility: CLINIC | Age: 74
Setting detail: THERAPIES SERIES
End: 2019-02-06
Attending: PSYCHIATRY & NEUROLOGY
Payer: MEDICARE

## 2019-02-06 ENCOUNTER — OFFICE VISIT (OUTPATIENT)
Dept: NEUROLOGY | Facility: CLINIC | Age: 74
End: 2019-02-06
Payer: MEDICARE

## 2019-02-06 VITALS
OXYGEN SATURATION: 93 % | HEART RATE: 106 BPM | DIASTOLIC BLOOD PRESSURE: 87 MMHG | WEIGHT: 206 LBS | BODY MASS INDEX: 29.49 KG/M2 | HEIGHT: 70 IN | SYSTOLIC BLOOD PRESSURE: 137 MMHG

## 2019-02-06 DIAGNOSIS — G31.85 CORTICOBASAL DEGENERATION (H): Primary | ICD-10-CM

## 2019-02-06 PROCEDURE — 97116 GAIT TRAINING THERAPY: CPT | Mod: GP | Performed by: PHYSICAL THERAPIST

## 2019-02-06 PROCEDURE — 97110 THERAPEUTIC EXERCISES: CPT | Mod: GP | Performed by: PHYSICAL THERAPIST

## 2019-02-06 ASSESSMENT — MIFFLIN-ST. JEOR: SCORE: 1675.66

## 2019-02-06 ASSESSMENT — PAIN SCALES - GENERAL: PAINLEVEL: NO PAIN (0)

## 2019-02-06 NOTE — LETTER
2/6/2019       RE: Elsy Paul  [address]  [place] [address]     Dear Colleague,    Thank you for referring your patient, Elsy Paul, to the [facility] NEUROLOGY at [facility]. Please see a copy of my visit note below.    Movement Disorder Clinic follow up note    Patient: Elsy Paul  Medical Record Number: [id number]  Encounter Date: February 6, 2019  PCP:Daron Ye    CC: Cortical basal degeneration    Impression:  1.  Cortical basal degeneration  2.  Peripheral vestibulopathy resolved    Recommendations:  1.  The patient has not improved even as his vestibulopathy has improved.  I think his worsening in November is most likely progression of his cortical basal degeneration.  2.  He would like to get a motorized scooter.  His daughter and she is wondering if he will have the cognitive and motor ability to control this.  I recommended that they rent one for a week and see if he can manage it.  It is a positive thing I am happy to write a prescription for it.  He could clearly use one as he cannot use a manual wheelchair given the weakness in his arms.  He is falling in his apartment.  He needs mobility to be able to get out for doctor's appointments and for mobility around his senior living center.  3.  I will see him back in 3 months.    Return to clinic: 3 months    Interval Hx:: Mr. Elsy Paul returns to clinic today for follow-up of his gait disorder associated with cortical basal degeneration.  He had a marked decline in late November after Thanksgiving.  He really has not improved since that time.  He is very unsteady and needs the assistance of one to get up and around into ambulate.  He is using a walker at all times.  He is living in an assisted living apartment called the [facility].  Thankfully he has had no severe falls with injury.    He is seeing Katie Black in physical therapy.  At last visit he had some evidence of vestibulopathy.   "Fibroids today and says that this is all resolved.  Even as things have resolved his gait has not improved.    He would like to get a motorized scooter.  He is falling in his home but thankfully without injury.  He has gait freezing.  He is at risk for any form of ambulation causing falls.  I think he could use a motorized scooter to help him with mobility.    Current medications    Movement Disorder-related Medications                                                                                                                                                             Current Outpatient Medications   Medication     acetaminophen (TYLENOL) 325 MG tablet     amLODIPine (NORVASC) 10 MG tablet     aspirin 81 MG EC tablet     carbidopa-levodopa (SINEMET)  MG per tablet     CARDURA XL 8 MG TB24     doxazosin (CARDURA) 8 MG tablet     JANTOVEN 2.5 MG tablet     lisinopril (PRINIVIL/ZESTRIL) 40 MG tablet     metoprolol succinate (TOPROL-XL) 100 MG 24 hr tablet     polyethylene glycol (MIRALAX/GLYCOLAX) powder     potassium chloride SA (K-DUR/KLOR-CON M) 20 MEQ CR tablet     warfarin (COUMADIN) 5 MG tablet     No current facility-administered medications for this visit.        Examination:  /87 (BP Location: Right arm)   Pulse 106   Ht 1.778 m (5' 10\")   Wt 93.4 kg (206 lb)   SpO2 93%   BMI 29.56 kg/m     General- no distress, no rash, good pulses, no edema  Respiratory-auscultation over the anterior lung fields is clear  Cardiac- regular rate and rhythm    Neurologic  Mental status  Patient is alert, appropriate, speech is fluent and comprehension is intact    Cranial nerve testing  Pupils are equal and reactive to light, visual fields are full to confrontation bilaterally  Extraocular movements are full  Facial sensation is intact, face is symmetric with rest and activation  Palate rises symmetrically, tongue protrudes at midline with normal movements  Sterocleidomastoid and trapezius strength is " normal and symmetric    On examination he is bright and alert.  Formal mental status testing is not performed.  He has marked rigidity of both arms with some additional paratonia in the left arm.  He has a positive grasp response with the left hand.  He has markedly slowed movements in the left greater than the right hands.  He has no alien limb phenomena.  He    35 minutes of total time was spent face-to-face with the patient over 50% of which was counseling..      Again, thank you for allowing me to participate in the care of your patient.      Sincerely,    Chaz Siu MD

## 2019-02-06 NOTE — PROGRESS NOTES
Outpatient Physical Therapy Discharge Note     Patient: Elsy Paul  : 1945    Beginning/End Dates of Reporting Period:  2019  to 2019. He was seen for 3 visits. Treatment included right and left PSCC CRPs for BPPV. Some gait training with swivel wheels on walker, standing frame for posture and stretching.     Referring Provider: Dr Siu    Therapy Diagnosis: Corticobasilar degeneration and vertigo. Plus falls.      Client Self Report: No vertigo, no dizziness. NO falls. Definately needs assistance when walking with walker  Carl has had to catch him. Carl is working on [employer] wheels (guardian) for the walker.     Objective Measurements:  Objective Measure: 25fTW with 2WW 15 seconds and 20 steps. Repeat very similar.  Details: His left leg takes smaller step then right. He is very near ot the wall on the left (not in the center of the mahan). Posture is flexed at  waist and neck is flexed but improves after standing frame.         Goals:  Goal Identifier posture-partially met. Issued two stretches that his sone can help with and he can try one himself.    Goal Description He will be able to work on his seated and standing posture IND to improve his mobility/balance   Target Date 19   Date Met  19   Progress:     Goal Identifier Dizziness/Vertigo-MET   Goal Description Improve pts complaints of dizziness/vertigo with position changes (in/out of bed).    Target Date 19   Date Met  19   Progress:       Progress Toward Goals:   Dizziness/Vertigo is gone. I did not see any torsional nystagmus during positional testing and he is very stiff at his head/neck/upper thoracic spine so positional testing was tough (required two people). . He did not report any reproduction of vertigo except once on left side. Both sides were treated for BPPV to rule this out. Today we used the standing frame ot stretch him out and work on posture. This was effective with only 5 minutes in  stander. He would really benefit from further PT and daily exercise to improve his stiffness, posture and mobility.     Plan:  Discharge from therapy.    Reason for Discharge: Patient has met all goals. TRansfer to home care PT for continued work on transitional movements, posture, gait and stiffness.     Equipment Issued: none. Recommend 5 inch swivel wheels for walker to assist with turning. He always has someone with him for transfers/gait.     Discharge Plan: Other services: Home care recommended at his assisted living.     Katie Black DPT, MPT, NCS

## 2019-02-06 NOTE — PROGRESS NOTES
Movement Disorder Clinic follow up note    Patient: Elsy Paul  Medical Record Number: [id number]  Encounter Date: February 6, 2019  PCP:Daron Ye    CC: Cortical basal degeneration    Impression:  1.  Cortical basal degeneration  2.  Peripheral vestibulopathy resolved    Recommendations:  1.  The patient has not improved even as his vestibulopathy has improved.  I think his worsening in November is most likely progression of his cortical basal degeneration.  2.  He would like to get a motorized scooter.  His daughter and she is wondering if he will have the cognitive and motor ability to control this.  I recommended that they rent one for a week and see if he can manage it.  It is a positive thing I am happy to write a prescription for it.  He could clearly use one as he cannot use a manual wheelchair given the weakness in his arms.  He is falling in his apartment.  He needs mobility to be able to get out for doctor's appointments and for mobility around his senior living center.  3.  I will see him back in 3 months.    Return to clinic: 3 months    Interval Hx:: Mr. Elsy Paul returns to clinic today for follow-up of his gait disorder associated with cortical basal degeneration.  He had a marked decline in late November after Thanksgiving.  He really has not improved since that time.  He is very unsteady and needs the assistance of one to get up and around into ambulate.  He is using a walker at all times.  He is living in an assisted living apartment called the [facility].  Thankfully he has had no severe falls with injury.    He is seeing Katie Black in physical therapy.  At last visit he had some evidence of vestibulopathy.  Fibroids today and says that this is all resolved.  Even as things have resolved his gait has not improved.    He would like to get a motorized scooter.  He is falling in his home but thankfully without injury.  He has gait freezing.  He is at risk for any form of ambulation  "causing falls.  I think he could use a motorized scooter to help him with mobility.    Current medications    Movement Disorder-related Medications                                                                                                                                                             Current Outpatient Medications   Medication     acetaminophen (TYLENOL) 325 MG tablet     amLODIPine (NORVASC) 10 MG tablet     aspirin 81 MG EC tablet     carbidopa-levodopa (SINEMET)  MG per tablet     CARDURA XL 8 MG TB24     doxazosin (CARDURA) 8 MG tablet     JANTOVEN 2.5 MG tablet     lisinopril (PRINIVIL/ZESTRIL) 40 MG tablet     metoprolol succinate (TOPROL-XL) 100 MG 24 hr tablet     polyethylene glycol (MIRALAX/GLYCOLAX) powder     potassium chloride SA (K-DUR/KLOR-CON M) 20 MEQ CR tablet     warfarin (COUMADIN) 5 MG tablet     No current facility-administered medications for this visit.        Examination:  /87 (BP Location: Right arm)   Pulse 106   Ht 1.778 m (5' 10\")   Wt 93.4 kg (206 lb)   SpO2 93%   BMI 29.56 kg/m    General- no distress, no rash, good pulses, no edema  Respiratory-auscultation over the anterior lung fields is clear  Cardiac- regular rate and rhythm    Neurologic  Mental status  Patient is alert, appropriate, speech is fluent and comprehension is intact    Cranial nerve testing  Pupils are equal and reactive to light, visual fields are full to confrontation bilaterally  Extraocular movements are full  Facial sensation is intact, face is symmetric with rest and activation  Palate rises symmetrically, tongue protrudes at midline with normal movements  Sterocleidomastoid and trapezius strength is normal and symmetric    On examination he is bright and alert.  Formal mental status testing is not performed.  He has marked rigidity of both arms with some additional paratonia in the left arm.  He has a positive grasp response with the left hand.  He has markedly slowed " movements in the left greater than the right hands.  He has no alien limb phenomena.  He    35 minutes of total time was spent face-to-face with the patient over 50% of which was counseling..

## 2019-02-07 ENCOUNTER — TELEPHONE (OUTPATIENT)
Dept: NEUROLOGY | Facility: CLINIC | Age: 74
End: 2019-02-07

## 2019-02-07 NOTE — TELEPHONE ENCOUNTER
Spoke with Shanna patient's daughter.  We discussed the prognosis.  I told her it is impossible to predict the exact prognosis.  The median survival from the time of diagnosis is 6 years for cortical basal degeneration.  The most risk comes from fall with head injury or from aspiration.    We had a long discussion about this.  I think she is very realistic about the future.

## 2019-03-15 DIAGNOSIS — G31.85 CORTICOBASAL DEGENERATION (H): Primary | ICD-10-CM

## 2019-03-18 ENCOUNTER — TELEPHONE (OUTPATIENT)
Dept: NEUROLOGY | Facility: CLINIC | Age: 74
End: 2019-03-18

## 2019-03-18 NOTE — TELEPHONE ENCOUNTER
[facility] CLINICAL DOCUMENTATION    Form Documentation Form or Letter Request    Type or form/letter needing completion: [employer] needs face to face office note  Provider: Salbador  Has provider seen patient for office visit related to reason for form request? Yes  Date form needed: ASAP  Once completed: Fax form to: [phone]     Last office note dated 2/6/19 faxed     Maria Fernanda Turner LPN

## 2019-03-19 ENCOUNTER — MEDICAL CORRESPONDENCE (OUTPATIENT)
Dept: HEALTH INFORMATION MANAGEMENT | Facility: CLINIC | Age: 74
End: 2019-03-19

## 2019-03-21 ENCOUNTER — DOCUMENTATION ONLY (OUTPATIENT)
Dept: NEUROLOGY | Facility: CLINIC | Age: 74
End: 2019-03-21

## 2019-03-21 NOTE — PROGRESS NOTES
Received forms for Home Health Certification And Plan Of Care 3/21/19, forms were placed in provider folder for signature 3/21/19    Received forms back signed by provider 3/26/19, forms were fax to [phone], 3/26/19    Daniella Moreno CMA

## 2019-03-22 ENCOUNTER — MYC MEDICAL ADVICE (OUTPATIENT)
Dept: NEUROLOGY | Facility: CLINIC | Age: 74
End: 2019-03-22

## 2019-03-22 NOTE — TELEPHONE ENCOUNTER
Best first medication is trazodone.  Would they like me to prescribe?  Or can the MD at [facility] prescribe.

## 2019-03-26 ENCOUNTER — MEDICAL CORRESPONDENCE (OUTPATIENT)
Dept: HEALTH INFORMATION MANAGEMENT | Facility: CLINIC | Age: 74
End: 2019-03-26

## 2019-03-26 NOTE — TELEPHONE ENCOUNTER
[facility], they informed me he lives at [facility] and to call them at [phone].    Called [facility] and spoke to Betty (nurse). Betty stated he does not see a doctor through the [facility] assisted living facility.

## 2019-03-27 ENCOUNTER — DOCUMENTATION ONLY (OUTPATIENT)
Dept: NEUROLOGY | Facility: CLINIC | Age: 74
End: 2019-03-27

## 2019-03-27 DIAGNOSIS — R45.1 AGITATION: Primary | ICD-10-CM

## 2019-03-27 RX ORDER — TRAZODONE HYDROCHLORIDE 50 MG/1
50 TABLET, FILM COATED ORAL AT BEDTIME
Qty: 60 TABLET | Refills: 3
Start: 2019-03-27 | End: 2020-03-27

## 2019-03-27 NOTE — PROGRESS NOTES
Received Plan Of Care for outpatient physical therapy 3/27/19, form was placed in provider folder for signature 3/27/19    Received forms back signed by provider 3/28/19, forms were fax to [phone], sent to be scanned 3/28/19    Daniella Moreno CMA

## 2019-03-27 NOTE — PROGRESS NOTES
Patient is becoming agitated at the nursing home.  They have exercised all environmental controls.  They asked for some pharmaceutical options.  I prescribed trazodone 50 mg at bedtime.  They will report back on the results

## 2019-03-27 NOTE — TELEPHONE ENCOUNTER
traZODone (DESYREL) 50 MG tablet 60 tablet 3 3/27/2019 3/27/2020 --   Sig - Route: Take 1 tablet (50 mg) by mouth At Bedtime - Oral     Above prescription faxed to [facility].

## 2019-03-28 ENCOUNTER — MEDICAL CORRESPONDENCE (OUTPATIENT)
Dept: HEALTH INFORMATION MANAGEMENT | Facility: CLINIC | Age: 74
End: 2019-03-28

## 2019-03-28 DIAGNOSIS — G20.C PARKINSONISM, UNSPECIFIED PARKINSONISM TYPE (H): Primary | ICD-10-CM

## 2019-03-29 DIAGNOSIS — G20.C PARKINSONISM, UNSPECIFIED PARKINSONISM TYPE (H): Primary | ICD-10-CM

## 2019-04-02 ENCOUNTER — TELEPHONE (OUTPATIENT)
Dept: PHYSICAL THERAPY | Facility: CLINIC | Age: 74
End: 2019-04-02

## 2019-04-02 DIAGNOSIS — G21.9 SECONDARY PARKINSONISM, UNSPECIFIED SECONDARY PARKINSONISM TYPE (H): Primary | ICD-10-CM

## 2019-04-02 NOTE — TELEPHONE ENCOUNTER
Levar's daughter, Shanna Paul, called last week and on 4/1. I spoke to her both times. She wanted to know about PT options for him. She was not happy with the [facility] team(through [facility])  that had been working with him recently at the [facility]. I suggested [facility]. I messaged DR Siu and he put in orders for home care PT on 3/29/2019. Shanna reprots he will be moving to [facility] in [place] at the end of this week for 24 hour care. He is currently not wt bearing on his legs much and using a w/c for some mobility. He needs assistance for all transfers. Shanna's cell phone is . I suggested she call [facility] about getting PT set up for Levar at [facility].  Katie Black DPT, MPT, NCS

## 2019-04-03 ENCOUNTER — DOCUMENTATION ONLY (OUTPATIENT)
Dept: NEUROLOGY | Facility: CLINIC | Age: 74
End: 2019-04-03

## 2019-04-03 ENCOUNTER — TELEPHONE (OUTPATIENT)
Dept: NEUROLOGY | Facility: CLINIC | Age: 74
End: 2019-04-03

## 2019-04-03 NOTE — TELEPHONE ENCOUNTER
"Faxed signed physician form back to the [facility] regarding stopping the trazodone due to symptoms of \"groggyiness\" and being \"sleepy all day.\"        "

## 2019-04-17 ENCOUNTER — RECORDS - HEALTHEAST (OUTPATIENT)
Dept: LAB | Facility: CLINIC | Age: 74
End: 2019-04-17

## 2019-04-17 LAB
ANION GAP SERPL CALCULATED.3IONS-SCNC: 10 MMOL/L (ref 5–18)
BUN SERPL-MCNC: 19 MG/DL (ref 8–28)
CALCIUM SERPL-MCNC: 9.1 MG/DL (ref 8.5–10.5)
CHLORIDE BLD-SCNC: 104 MMOL/L (ref 98–107)
CO2 SERPL-SCNC: 26 MMOL/L (ref 22–31)
CREAT SERPL-MCNC: 1.14 MG/DL (ref 0.7–1.3)
ERYTHROCYTE [DISTWIDTH] IN BLOOD BY AUTOMATED COUNT: 12.9 % (ref 11–14.5)
GFR SERPL CREATININE-BSD FRML MDRD: >60 ML/MIN/1.73M2
GLUCOSE BLD-MCNC: 76 MG/DL (ref 70–125)
HCT VFR BLD AUTO: 41.2 % (ref 40–54)
HGB BLD-MCNC: 14.1 G/DL (ref 14–18)
MCH RBC QN AUTO: 33.3 PG (ref 27–34)
MCHC RBC AUTO-ENTMCNC: 34.2 G/DL (ref 32–36)
MCV RBC AUTO: 97 FL (ref 80–100)
PLATELET # BLD AUTO: 156 THOU/UL (ref 140–440)
PMV BLD AUTO: 10.1 FL (ref 8.5–12.5)
POTASSIUM BLD-SCNC: 3.5 MMOL/L (ref 3.5–5)
RBC # BLD AUTO: 4.24 MILL/UL (ref 4.4–6.2)
SODIUM SERPL-SCNC: 140 MMOL/L (ref 136–145)
WBC: 7.2 THOU/UL (ref 4–11)

## 2019-04-18 LAB — 25(OH)D3 SERPL-MCNC: 21.6 NG/ML (ref 30–80)

## 2019-04-25 ENCOUNTER — DOCUMENTATION ONLY (OUTPATIENT)
Dept: NEUROLOGY | Facility: CLINIC | Age: 74
End: 2019-04-25

## 2019-04-25 NOTE — PROGRESS NOTES
Received Plan of Care from [employer] 4/25/19, form was placed in provider folder for signature 4/25/19      Received forms back signed by provider, forms were fax to [phone] 4/30/19, sent to be scanned 4/30/19    Daniella Moreno CMA

## 2019-04-29 ENCOUNTER — DOCUMENTATION ONLY (OUTPATIENT)
Dept: NEUROLOGY | Facility: CLINIC | Age: 74
End: 2019-04-29

## 2019-04-29 NOTE — PROGRESS NOTES
Received Orders from [facility] 4/29/19, orders were placed in provider home 4/29/19    Received forms back signed by provider 4/30/19, forms were fax to [phone], sent to be scanned     Daniella Moreno CMA

## 2019-04-30 ENCOUNTER — MEDICAL CORRESPONDENCE (OUTPATIENT)
Dept: HEALTH INFORMATION MANAGEMENT | Facility: CLINIC | Age: 74
End: 2019-04-30

## 2019-05-01 ENCOUNTER — OFFICE VISIT (OUTPATIENT)
Dept: NEUROLOGY | Facility: CLINIC | Age: 74
End: 2019-05-01
Payer: MEDICARE

## 2019-05-01 VITALS
HEART RATE: 69 BPM | HEIGHT: 70 IN | WEIGHT: 209 LBS | BODY MASS INDEX: 29.92 KG/M2 | SYSTOLIC BLOOD PRESSURE: 127 MMHG | DIASTOLIC BLOOD PRESSURE: 85 MMHG | OXYGEN SATURATION: 97 %

## 2019-05-01 DIAGNOSIS — G31.85 CORTICOBASAL DEGENERATION (H): Primary | ICD-10-CM

## 2019-05-01 RX ORDER — TAMSULOSIN HYDROCHLORIDE 0.4 MG/1
0.4 CAPSULE ORAL DAILY
COMMUNITY
Start: 2019-02-15

## 2019-05-01 ASSESSMENT — ENCOUNTER SYMPTOMS
BLOATING: 0
EYE WATERING: 1
EYE IRRITATION: 1
MYALGIAS: 0
DYSPNEA ON EXERTION: 0
JAUNDICE: 0
HEADACHES: 0
DIFFICULTY URINATING: 0
MUSCLE CRAMPS: 0
WEAKNESS: 1
NERVOUS/ANXIOUS: 1
BACK PAIN: 0
MEMORY LOSS: 1
SPEECH CHANGE: 1
BLOOD IN STOOL: 0
HEMOPTYSIS: 0
CONSTIPATION: 1
STIFFNESS: 1
TINGLING: 0
JOINT SWELLING: 0
BOWEL INCONTINENCE: 0
MUSCLE WEAKNESS: 1
EYE PAIN: 0
SEIZURES: 0
COUGH: 1
DEPRESSION: 1
DIARRHEA: 0
EYE REDNESS: 1
SPUTUM PRODUCTION: 0
PARALYSIS: 0
DOUBLE VISION: 0
DECREASED CONCENTRATION: 1
VOMITING: 0
SNORES LOUDLY: 0
RECTAL PAIN: 0
HEARTBURN: 0
COUGH DISTURBING SLEEP: 0
NECK PAIN: 0
NUMBNESS: 0
LOSS OF CONSCIOUSNESS: 0
DYSURIA: 0
INSOMNIA: 0
NAUSEA: 0
ARTHRALGIAS: 0
POSTURAL DYSPNEA: 0
ABDOMINAL PAIN: 0
TREMORS: 0
PANIC: 0
DIZZINESS: 1
SHORTNESS OF BREATH: 0
FLANK PAIN: 0
HEMATURIA: 0
DISTURBANCES IN COORDINATION: 1
WHEEZING: 0

## 2019-05-01 ASSESSMENT — MIFFLIN-ST. JEOR: SCORE: 1689.27

## 2019-05-01 ASSESSMENT — PAIN SCALES - GENERAL: PAINLEVEL: NO PAIN (0)

## 2019-05-01 NOTE — LETTER
5/1/2019       RE: Elsy Paul  [address]  [place] [address]     Dear Colleague,    Thank you for referring your patient, Elsy Paul, to the [facility] NEUROLOGY at [facility]. Please see a copy of my visit note below.    Movement Disorder Clinic follow up note    Patient: Elsy Paul  Medical Record Number: [id number]  Encounter Date: May 1, 2019  PCP:Daron Ye    CC: Cortical basal degeneration    Impression:  1.  Cortical basal degeneration with slow progression    Recommendations:  1.  Unfortunately we have no new or experimental therapies for cortical basal degeneration.  I discussed with him that we are watching for tau-based therapies but none are available at this time.  2.  We will follow him along and see if there is anything else to add.  Currently he is not agitated and does not require any pharmacotherapy for this.    Return to clinic: 3 months    Interval Hx:: Mr. Elsy Paul returns to clinic today for follow-up of his cortical basal degeneration.  He comes with daughter, Shanna, and son, Carl.  Mr. Paul has moved from his assisted living center where he could no longer manage and is now in a nursing home in [place].  They are all quite pleased with his new accommodations.  He is getting physical therapy now but they are not sure how long this will last.  He is walked with assistance several times a day.  He is getting excellent care.    He had developed some agitation at assisted living and I had prescribed trazodone.  He only took this for 3 days and felt it was making him feel poorly and he stopped it.  He is not having agitation at this time.    Shanna and darren note slow deterioration.  His walking is worse and he needs much more assistance.  Cognitively he is worse but he maintains a bright affect.    Current medications    Movement Disorder-related Medications                                                                             "                                                                                 Current Outpatient Medications   Medication     acetaminophen (TYLENOL) 325 MG tablet     amLODIPine (NORVASC) 10 MG tablet     aspirin 81 MG EC tablet     carbidopa-levodopa (SINEMET)  MG per tablet     CARDURA XL 8 MG TB24     doxazosin (CARDURA) 8 MG tablet     JANTOVEN 2.5 MG tablet     lisinopril (PRINIVIL/ZESTRIL) 40 MG tablet     metoprolol succinate (TOPROL-XL) 100 MG 24 hr tablet     polyethylene glycol (MIRALAX/GLYCOLAX) powder     potassium chloride SA (K-DUR/KLOR-CON M) 20 MEQ CR tablet     tamsulosin (FLOMAX) 0.4 MG capsule     traZODone (DESYREL) 50 MG tablet     warfarin (COUMADIN) 5 MG tablet     No current facility-administered medications for this visit.        Examination:  /85 (BP Location: Right arm, Patient Position: Sitting, Cuff Size: Adult Large)   Pulse 69   Ht 1.778 m (5' 10\")   Wt 94.8 kg (209 lb)   SpO2 97%   BMI 29.99 kg/m     General- no distress, no rash, good pulses, no edema  Respiratory-auscultation over the anterior lung fields is clear  Cardiac- regular rate and rhythm    Neurologic  Mental status  Patient is alert, appropriate, speech is fluent and comprehension is intact    Cranial nerve testing  Pupils are equal and reactive to light, visual fields are full to confrontation bilaterally  Extraocular movements are full  Facial sensation is intact, face is symmetric with rest and activation  Palate rises symmetrically, tongue protrudes at midline with normal movements  Sterocleidomastoid and trapezius strength is normal and symmetric    Motor  4+ rigidity left arm 2+ right 3+ rigidity both legs.  No voluntary movement left fingers.  Reduced AMRs right hand right fingers.  No voluntary movement left foot.  Gait is not tested as the patient cannot stand independently no myoclonus seen.  No alien limb seen.    Sensation  Intact to pin, vibration   Proprioception intact in great toes and " fingers    Tendon reflexes  Testing at brachioradialis, biceps, triceps, patella and achilles bilaterally showed normal reflexes, symmetrically, without Babinski or Mart signs    Coordination  Finger nose finger testing: normalRapid alternating movements are normal.    15 minutes of total time was spent face-to-face with the patient over 50% of which was counseling..    Again, thank you for allowing me to participate in the care of your patient.      Sincerely,    Chaz Siu MD

## 2019-05-01 NOTE — PROGRESS NOTES
Movement Disorder Clinic follow up note    Patient: Elsy Paul  Medical Record Number: [id number]  Encounter Date: May 1, 2019  PCP:Daron Ye    CC: Cortical basal degeneration    Impression:  1.  Cortical basal degeneration with slow progression    Recommendations:  1.  Unfortunately we have no new or experimental therapies for cortical basal degeneration.  I discussed with him that we are watching for tau-based therapies but none are available at this time.  2.  We will follow him along and see if there is anything else to add.  Currently he is not agitated and does not require any pharmacotherapy for this.    Return to clinic: 3 months    Interval Hx:: Mr. Elsy Paul returns to clinic today for follow-up of his cortical basal degeneration.  He comes with daughter, Shanna, and son, Carl.  Mr. Paul has moved from his assisted living center where he could no longer manage and is now in a nursing home in [place].  They are all quite pleased with his new accommodations.  He is getting physical therapy now but they are not sure how long this will last.  He is walked with assistance several times a day.  He is getting excellent care.    He had developed some agitation at assisted living and I had prescribed trazodone.  He only took this for 3 days and felt it was making him feel poorly and he stopped it.  He is not having agitation at this time.    Shanna and darren note slow deterioration.  His walking is worse and he needs much more assistance.  Cognitively he is worse but he maintains a bright affect.    Current medications    Movement Disorder-related Medications                                                                                                                                                             Current Outpatient Medications   Medication     acetaminophen (TYLENOL) 325 MG tablet     amLODIPine (NORVASC) 10 MG tablet     aspirin 81 MG EC tablet     carbidopa-levodopa (SINEMET)  " MG per tablet     CARDURA XL 8 MG TB24     doxazosin (CARDURA) 8 MG tablet     JANTOVEN 2.5 MG tablet     lisinopril (PRINIVIL/ZESTRIL) 40 MG tablet     metoprolol succinate (TOPROL-XL) 100 MG 24 hr tablet     polyethylene glycol (MIRALAX/GLYCOLAX) powder     potassium chloride SA (K-DUR/KLOR-CON M) 20 MEQ CR tablet     tamsulosin (FLOMAX) 0.4 MG capsule     traZODone (DESYREL) 50 MG tablet     warfarin (COUMADIN) 5 MG tablet     No current facility-administered medications for this visit.        Examination:  /85 (BP Location: Right arm, Patient Position: Sitting, Cuff Size: Adult Large)   Pulse 69   Ht 1.778 m (5' 10\")   Wt 94.8 kg (209 lb)   SpO2 97%   BMI 29.99 kg/m    General- no distress, no rash, good pulses, no edema  Respiratory-auscultation over the anterior lung fields is clear  Cardiac- regular rate and rhythm    Neurologic  Mental status  Patient is alert, appropriate, speech is fluent and comprehension is intact    Cranial nerve testing  Pupils are equal and reactive to light, visual fields are full to confrontation bilaterally  Extraocular movements are full  Facial sensation is intact, face is symmetric with rest and activation  Palate rises symmetrically, tongue protrudes at midline with normal movements  Sterocleidomastoid and trapezius strength is normal and symmetric    Motor  4+ rigidity left arm 2+ right 3+ rigidity both legs.  No voluntary movement left fingers.  Reduced AMRs right hand right fingers.  No voluntary movement left foot.  Gait is not tested as the patient cannot stand independently no myoclonus seen.  No alien limb seen.    Sensation  Intact to pin, vibration   Proprioception intact in great toes and fingers    Tendon reflexes  Testing at brachioradialis, biceps, triceps, patella and achilles bilaterally showed normal reflexes, symmetrically, without Babinski or Mart signs    Coordination  Finger nose finger testing: normalRapid alternating movements are " normal.    15 minutes of total time was spent face-to-face with the patient over 50% of which was counseling..    Answers for HPI/ROS submitted by the patient on 5/1/2019   General Symptoms: No  Skin Symptoms: No  HENT Symptoms: No  EYE SYMPTOMS: Yes  HEART SYMPTOMS: No  LUNG SYMPTOMS: Yes  INTESTINAL SYMPTOMS: Yes  URINARY SYMPTOMS: Yes  REPRODUCTIVE SYMPTOMS: No  SKELETAL SYMPTOMS: Yes  BLOOD SYMPTOMS: No  NERVOUS SYSTEM SYMPTOMS: Yes  MENTAL HEALTH SYMPTOMS: Yes  Eye pain: No  Vision loss: No  Dry eyes: No  Watery eyes: Yes  Eye bulging: No  Double vision: No  Flashing of lights: No  Spots: No  Floaters: No  Redness: Yes  Crossed eyes: No  Tunnel Vision: No  Yellowing of eyes: No  Eye irritation: Yes  Cough: Yes  Sputum or phlegm: No  Coughing up blood: No  Difficulty breating or shortness of breath: No  Snoring: No  Wheezing: No  Difficulty breathing on exertion: No  Nighttime Cough: No  Difficulty breathing when lying flat: No  Heart burn or indigestion: No  Nausea: No  Vomiting: No  Abdominal pain: No  Bloating: No  Constipation: Yes  Diarrhea: No  Blood in stool: No  Black stools: No  Rectal or Anal pain: No  Fecal incontinence: No  Yellowing of skin or eyes: No  Vomit with blood: No  Change in stools: No  Trouble holding urine or incontinence: Yes  Pain or burning: No  Trouble starting or stopping: No  Increased frequency of urination: No  Blood in urine: No  Decreased frequency of urination: No  Frequent nighttime urination: Yes  Flank pain: No  Difficulty emptying bladder: No  Back pain: No  Muscle aches: No  Neck pain: No  Swollen joints: No  Joint pain: No  Bone pain: No  Muscle cramps: No  Muscle weakness: Yes  Joint stiffness: Yes  Bone fracture: No  Trouble with coordination: Yes  Dizziness or trouble with balance: Yes  Fainting or black-out spells: No  Memory loss: Yes  Headache: No  Seizures: No  Speech problems: Yes  Tingling: No  Tremor: No  Weakness: Yes  Difficulty walking: Yes  Paralysis:  No  Numbness: No  Nervous or Anxious: Yes  Depression: Yes  Trouble sleeping: No  Trouble thinking or concentrating: Yes  Mood changes: No  Panic attacks: No

## 2019-05-28 ENCOUNTER — RECORDS - HEALTHEAST (OUTPATIENT)
Dept: LAB | Facility: CLINIC | Age: 74
End: 2019-05-28

## 2019-05-29 LAB — 25(OH)D3 SERPL-MCNC: 26.9 NG/ML (ref 30–80)

## 2019-06-25 ENCOUNTER — RECORDS - HEALTHEAST (OUTPATIENT)
Dept: LAB | Facility: CLINIC | Age: 74
End: 2019-06-25

## 2019-06-26 LAB — 25(OH)D3 SERPL-MCNC: 29.6 NG/ML (ref 30–80)

## 2019-07-12 ENCOUNTER — DOCUMENTATION ONLY (OUTPATIENT)
Dept: NEUROLOGY | Facility: CLINIC | Age: 74
End: 2019-07-12

## 2019-07-12 NOTE — PROGRESS NOTES
Received forms from [facility], forms were placed in provider folder 7/12/19 for signature    Received forms back signed by provider,forms were fax [phone], sent to be scanned

## 2019-08-07 ENCOUNTER — OFFICE VISIT (OUTPATIENT)
Dept: NEUROLOGY | Facility: CLINIC | Age: 74
End: 2019-08-07
Payer: MEDICARE

## 2019-08-07 VITALS — SYSTOLIC BLOOD PRESSURE: 128 MMHG | OXYGEN SATURATION: 95 % | DIASTOLIC BLOOD PRESSURE: 87 MMHG | HEART RATE: 70 BPM

## 2019-08-07 DIAGNOSIS — G47.00 INSOMNIA, UNSPECIFIED TYPE: Primary | ICD-10-CM

## 2019-08-07 DIAGNOSIS — G31.85 CORTICOBASAL DEGENERATION (H): ICD-10-CM

## 2019-08-07 RX ORDER — AMMONIUM LACTATE 12 G/100G
CREAM TOPICAL
COMMUNITY

## 2019-08-07 RX ORDER — ACETAMINOPHEN 500 MG
1000 TABLET ORAL 2 TIMES DAILY
COMMUNITY
Start: 2019-04-11

## 2019-08-07 RX ORDER — POLYVINYL ALCOHOL 14 MG/ML
1 SOLUTION/ DROPS OPHTHALMIC 3 TIMES DAILY
COMMUNITY

## 2019-08-07 RX ORDER — MIRTAZAPINE 15 MG/1
15 TABLET, FILM COATED ORAL AT BEDTIME
Qty: 30 TABLET | Refills: 11 | Status: SHIPPED | OUTPATIENT
Start: 2019-08-07 | End: 2019-11-19

## 2019-08-07 RX ORDER — LISINOPRIL 30 MG/1
30 TABLET ORAL DAILY
COMMUNITY
Start: 2019-07-23

## 2019-08-07 RX ORDER — FINASTERIDE 5 MG/1
5 TABLET, FILM COATED ORAL DAILY
COMMUNITY
Start: 2019-08-06

## 2019-08-07 RX ORDER — MULTIVIT-MIN/IRON/FOLIC ACID/K 18-600-40
2000 CAPSULE ORAL DAILY
COMMUNITY
Start: 2019-04-11

## 2019-08-07 RX ORDER — NYSTATIN 100000 U/G
OINTMENT TOPICAL
COMMUNITY
Start: 2019-04-11

## 2019-08-07 RX ORDER — AMOXICILLIN 250 MG
2 CAPSULE ORAL 2 TIMES DAILY PRN
COMMUNITY
Start: 2019-04-11

## 2019-08-07 ASSESSMENT — PAIN SCALES - GENERAL: PAINLEVEL: NO PAIN (0)

## 2019-08-07 NOTE — NURSING NOTE
Chief Complaint   Patient presents with     RECHECK     [facility] RETURN - FOLLOW UP       Arun Newell, EMT

## 2019-08-07 NOTE — LETTER
8/7/2019       RE: Elsy Paul  [address]  [place] [address]     Dear Colleague,    Thank you for referring your patient, Elsy Paul, to the [facility] NEUROLOGY at [facility]. Please see a copy of my visit note below.    Movement Disorder Clinic follow up note    Patient: Elsy Paul  Medical Record Number: [id number]  Encounter Date: August 7, 2019  PCP:Daron Ye    CC: Cortical basal degeneration    Impression:  1.  Cortical basal degeneration  2.  Insomnia    Recommendations:  1.  The patient has not had any marked progression.  He continues to have the classic signs of cortical basal degeneration with severe rigidity and akinesia of the left arm.  He also is developing myoclonus of the left thumb.  There is no alien limb phenomena or cortical sensory loss in the left arm however.  He is also developing some signs of overlap syndrome with loss of vertical gaze.  He continues to have severe postural abnormality and he can stand and walk only with assistance.  2.  Trial of mirtazapine 15 mg at bedtime for insomnia.  Shanna will call if this is effective.  If it is not we could try going up to 30 mg at bedtime.  3.  See back in 3 months    Return to clinic: 3 months    Interval Hx:: Mr. 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  The patient is living in [place] in a [facility] center.  He finds that this is pleasant except for the food.  They try to get him up to walk but many times he refuses to walk.  By his care plan he is supposed to get up to walk twice a day.  He is noting that his left hand does not move when he wants it to.  It does not rise up or do things on his own.  He has to look at it and commanded to move.  He feels it is getting stiff.  Thankfully it is not painful.  He is having insomnia lays at night worrying about things.  This makes him tired through the day.  Thankfully he has had no falls.  He has  not had any marked changes in his status as he had in the past.    Shanna notes that at times he cannot open his eyes.  They are to squeeze shot.  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  It is not evident in the exam today.    Current medications    Movement Disorder-related Medications                                                                                                                                                             Current Outpatient Medications   Medication     acetaminophen (TYLENOL) 500 MG tablet     amLODIPine (NORVASC) 10 MG tablet     ammonium lactate (AMLACTIN) 12 % external cream     Cholecalciferol (VITAMIN D) 2000 units CAPS     finasteride (PROSCAR) 5 MG tablet     lisinopril (PRINIVIL/ZESTRIL) 30 MG tablet     metoprolol succinate (TOPROL-XL) 100 MG 24 hr tablet     mirtazapine (REMERON) 15 MG tablet     nystatin (MYCOSTATIN) 907928 UNIT/GM external ointment     polyethylene glycol (MIRALAX/GLYCOLAX) powder     polyvinyl alcohol (LIQUIFILM TEARS) 1.4 % ophthalmic solution     potassium chloride SA (K-DUR/KLOR-CON M) 20 MEQ CR tablet     senna-docusate (SENOKOT-S/PERICOLACE) 8.6-50 MG tablet     tamsulosin (FLOMAX) 0.4 MG capsule     acetaminophen (TYLENOL) 325 MG tablet     aspirin 81 MG EC tablet     CARDURA XL 8 MG TB24     doxazosin (CARDURA) 8 MG tablet     JANTOVEN 2.5 MG tablet     lisinopril (PRINIVIL/ZESTRIL) 40 MG tablet     traZODone (DESYREL) 50 MG tablet     warfarin (COUMADIN) 5 MG tablet     No current facility-administered medications for this visit.        Examination:  /87 (BP Location: Right arm, Patient Position: Sitting, Cuff Size: Adult Large)   Pulse 70   SpO2 95%   General- no distress, no rash, good pulses, no edema  Respiratory-auscultation over the anterior lung fields is clear  Cardiac- regular rate and rhythm    Neurologic  Mental status  Patient is alert, appropriate, speech is fluent and comprehension is intact    Cranial nerve  testing  Pupils are equal and reactive to light, visual fields are full to confrontation bilaterally  Upgaze -4, downgaze -3.  No square wave jerks.  Pursuit is saccadic.  Saccades are very slow.  Facial sensation is intact, face is symmetric with rest and activation.  3+ mask face.  No blepharospasm noted.  Palate rises symmetrically, tongue protrudes at midline with normal movements  Sterocleidomastoid and trapezius strength is normal and symmetric    Motor  Rigidity right arm 3+ left arm 4+.  Left arm is completely rigid and akinetic.  Strength is 5/5 shoulder abduction, finger abduction, arm flexion and extension, hip flexion, plantar and dorsiflexion    Sensation  Intact to pin, vibration   Proprioception intact in great toes and fingers    Tendon reflexes  Testing at brachioradialis, biceps, triceps, patella and achilles bilaterally showed normal reflexes, symmetrically, without Babinski or Mart signs    Coordination  Finger nose finger testing: Movements of the fingers are -3 -4.  The patient cannot open and close his left hand at all.  There is no alien limb phenomena.  There are no mirror movements.  There is continuous myoclonus of the left thumb.    25 minutes of total time was spent face-to-face with the patient over 50% of which was counseling..    Again, thank you for allowing me to participate in the care of your patient.      Sincerely,    Chaz Siu MD

## 2019-08-07 NOTE — PROGRESS NOTES
Movement Disorder Clinic follow up note    Patient: Elsy Paul  Medical Record Number: [id number]  Encounter Date: August 7, 2019  PCP:Daron Ye    CC: Cortical basal degeneration    Impression:  1.  Cortical basal degeneration  2.  Insomnia    Recommendations:  1.  The patient has not had any marked progression.  He continues to have the classic signs of cortical basal degeneration with severe rigidity and akinesia of the left arm.  He also is developing myoclonus of the left thumb.  There is no alien limb phenomena or cortical sensory loss in the left arm however.  He is also developing some signs of overlap syndrome with loss of vertical gaze.  He continues to have severe postural abnormality and he can stand and walk only with assistance.  2.  Trial of mirtazapine 15 mg at bedtime for insomnia.  Shanna will call if this is effective.  If it is not we could try going up to 30 mg at bedtime.  3.  See back in 3 months    Return to clinic: 3 months    Interval Hx:: Mr. 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  The patient is living in [place] in a [facility] center.  He finds that this is pleasant except for the food.  They try to get him up to walk but many times he refuses to walk.  By his care plan he is supposed to get up to walk twice a day.  He is noting that his left hand does not move when he wants it to.  It does not rise up or do things on his own.  He has to look at it and commanded to move.  He feels it is getting stiff.  Thankfully it is not painful.  He is having insomnia lays at night worrying about things.  This makes him tired through the day.  Thankfully he has had no falls.  He has not had any marked changes in his status as he had in the past.    Shanna notes that at times he cannot open his eyes.  They are to squeeze shot.  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  It is not evident in the exam today.      Current medications    Movement  Disorder-related Medications                                                                                                                                                             Current Outpatient Medications   Medication     acetaminophen (TYLENOL) 500 MG tablet     amLODIPine (NORVASC) 10 MG tablet     ammonium lactate (AMLACTIN) 12 % external cream     Cholecalciferol (VITAMIN D) 2000 units CAPS     finasteride (PROSCAR) 5 MG tablet     lisinopril (PRINIVIL/ZESTRIL) 30 MG tablet     metoprolol succinate (TOPROL-XL) 100 MG 24 hr tablet     mirtazapine (REMERON) 15 MG tablet     nystatin (MYCOSTATIN) 379356 UNIT/GM external ointment     polyethylene glycol (MIRALAX/GLYCOLAX) powder     polyvinyl alcohol (LIQUIFILM TEARS) 1.4 % ophthalmic solution     potassium chloride SA (K-DUR/KLOR-CON M) 20 MEQ CR tablet     senna-docusate (SENOKOT-S/PERICOLACE) 8.6-50 MG tablet     tamsulosin (FLOMAX) 0.4 MG capsule     acetaminophen (TYLENOL) 325 MG tablet     aspirin 81 MG EC tablet     CARDURA XL 8 MG TB24     doxazosin (CARDURA) 8 MG tablet     JANTOVEN 2.5 MG tablet     lisinopril (PRINIVIL/ZESTRIL) 40 MG tablet     traZODone (DESYREL) 50 MG tablet     warfarin (COUMADIN) 5 MG tablet     No current facility-administered medications for this visit.        Examination:  /87 (BP Location: Right arm, Patient Position: Sitting, Cuff Size: Adult Large)   Pulse 70   SpO2 95%   General- no distress, no rash, good pulses, no edema  Respiratory-auscultation over the anterior lung fields is clear  Cardiac- regular rate and rhythm    Neurologic  Mental status  Patient is alert, appropriate, speech is fluent and comprehension is intact    Cranial nerve testing  Pupils are equal and reactive to light, visual fields are full to confrontation bilaterally  Upgaze -4, downgaze -3.  No square wave jerks.  Pursuit is saccadic.  Saccades are very slow.  Facial sensation is intact, face is symmetric with rest and activation.  3+  mask face.  No blepharospasm noted.  Palate rises symmetrically, tongue protrudes at midline with normal movements  Sterocleidomastoid and trapezius strength is normal and symmetric    Motor  Rigidity right arm 3+ left arm 4+.  Left arm is completely rigid and akinetic.  Strength is 5/5 shoulder abduction, finger abduction, arm flexion and extension, hip flexion, plantar and dorsiflexion    Sensation  Intact to pin, vibration   Proprioception intact in great toes and fingers    Tendon reflexes  Testing at brachioradialis, biceps, triceps, patella and achilles bilaterally showed normal reflexes, symmetrically, without Babinski or Mart signs    Coordination  Finger nose finger testing: Movements of the fingers are -3 -4.  The patient cannot open and close his left hand at all.  There is no alien limb phenomena.  There are no mirror movements.  There is continuous myoclonus of the left thumb.    25 minutes of total time was spent face-to-face with the patient over 50% of which was counseling..

## 2019-11-19 ENCOUNTER — OFFICE VISIT (OUTPATIENT)
Dept: NEUROLOGY | Facility: CLINIC | Age: 74
End: 2019-11-19
Payer: MEDICARE

## 2019-11-19 VITALS
HEART RATE: 85 BPM | SYSTOLIC BLOOD PRESSURE: 139 MMHG | OXYGEN SATURATION: 95 % | RESPIRATION RATE: 16 BRPM | DIASTOLIC BLOOD PRESSURE: 92 MMHG

## 2019-11-19 DIAGNOSIS — G47.00 INSOMNIA, UNSPECIFIED TYPE: ICD-10-CM

## 2019-11-19 DIAGNOSIS — G31.85 CORTICOBASAL SYNDROME (H): Primary | ICD-10-CM

## 2019-11-19 PROBLEM — C18.9 MALIGNANT NEOPLASM OF COLON (H): Status: ACTIVE | Noted: 2019-04-11

## 2019-11-19 PROBLEM — R41.89 COGNITIVE DECLINE: Status: ACTIVE | Noted: 2018-07-16

## 2019-11-19 PROBLEM — R29.6 FREQUENT FALLS: Status: ACTIVE | Noted: 2018-11-27

## 2019-11-19 PROBLEM — H43.811 VITREOUS DEGENERATION, RIGHT EYE: Status: ACTIVE | Noted: 2019-11-19

## 2019-11-19 PROBLEM — K92.1 GASTROINTESTINAL HEMORRHAGE WITH MELENA: Status: ACTIVE | Noted: 2018-07-13

## 2019-11-19 PROBLEM — G25.9 MOVEMENT DISORDER: Status: ACTIVE | Noted: 2018-07-16

## 2019-11-19 PROBLEM — K59.09 CHRONIC CONSTIPATION: Status: ACTIVE | Noted: 2019-04-11

## 2019-11-19 PROBLEM — E87.6 HYPOKALEMIA: Status: ACTIVE | Noted: 2018-11-27
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
## 2019-11-19 PROBLEM — H25.10 SENILE NUCLEAR SCLEROSIS: Status: ACTIVE | Noted: 2019-11-19

## 2019-11-19 PROBLEM — K55.9 ISCHEMIC COLITIS (H): Status: ACTIVE | Noted: 2018-07-25

## 2019-11-19 PROBLEM — K62.5 GASTROINTESTINAL HEMORRHAGE ASSOCIATED WITH ANORECTAL SOURCE: Status: ACTIVE | Noted: 2018-07-14

## 2019-11-19 PROBLEM — E55.9 VITAMIN D DEFICIENCY: Status: ACTIVE | Noted: 2019-04-11

## 2019-11-19 PROBLEM — H55.02 LATENT NYSTAGMUS: Status: ACTIVE | Noted: 2019-11-19

## 2019-11-19 PROBLEM — R35.0 BENIGN PROSTATIC HYPERPLASIA WITH URINARY FREQUENCY: Status: ACTIVE | Noted: 2019-04-11

## 2019-11-19 PROBLEM — Z71.89 ACP (ADVANCE CARE PLANNING): Status: ACTIVE | Noted: 2018-08-16

## 2019-11-19 PROBLEM — Z91.81 AT HIGH RISK FOR FALLS: Status: ACTIVE | Noted: 2019-02-13

## 2019-11-19 PROBLEM — N40.1 BENIGN PROSTATIC HYPERPLASIA WITH URINARY FREQUENCY: Status: ACTIVE | Noted: 2019-04-11

## 2019-11-19 PROBLEM — H52.4 PRESBYOPIA: Status: ACTIVE | Noted: 2019-11-19

## 2019-11-19 PROBLEM — T14.8XXA SUBCUTANEOUS HEMATOMA: Status: ACTIVE | Noted: 2018-11-27

## 2019-11-19 PROBLEM — I95.1 ORTHOSTATIC HYPOTENSION: Status: ACTIVE | Noted: 2018-11-27

## 2019-11-19 PROBLEM — R55 NEAR SYNCOPE: Status: ACTIVE | Noted: 2018-11-22

## 2019-11-19 PROBLEM — H35.371 PUCKERING OF MACULA, RIGHT EYE: Status: ACTIVE | Noted: 2019-11-19

## 2019-11-19 PROBLEM — R13.10 DYSPHAGIA: Status: ACTIVE | Noted: 2019-04-11

## 2019-11-19 PROBLEM — H52.00 HYPERMETROPIA: Status: ACTIVE | Noted: 2019-11-19

## 2019-11-19 PROBLEM — H52.03 HYPERMETROPIA, BILATERAL: Status: ACTIVE | Noted: 2019-11-19

## 2019-11-19 RX ORDER — MIRTAZAPINE 15 MG/1
7.5 TABLET, FILM COATED ORAL AT BEDTIME
Qty: 30 TABLET | Refills: 3 | Status: SHIPPED | OUTPATIENT
Start: 2019-11-19

## 2019-11-19 ASSESSMENT — PAIN SCALES - GENERAL: PAINLEVEL: NO PAIN (0)

## 2019-11-19 NOTE — LETTER
2019       RE: Elsy Paul  [address] [place] [address]     Dear Colleague,    Thank you for referring your patient, Elsy Paul, to the [facility] NEUROLOGY at [facility]. Please see a copy of my visit note below.    Department of Neurology  Movement Disorders Division     Patient: Elsy Paul   MRN: [id number]   : 1945   Date of Visit: 2019     Reason for visit: cortical basal degeneration follow up    History of Present Illness  Mr. Paul is a 74 year old  Male that presents to Neurology Movement clinic for follow up. Patient was last seen on 2019 where he was started on mirtazapine 15 mg to improve sleep.     History obtained from patient and accompanied by daughter and son.  Daughter reports that patient symptoms are unchanged.  She has noticed that he is drooling more often since his last clinic visit. Patient shares that he has a hard time falling asleep and is fatigued in the day. He associated this with mirtazapine and is refusing to take this medication. Daughter and son have observed that patient was sleeping well while on mirtazapine and was more alert during the day. They suspect that the patient may have mirtazapine confused with another medication that caused him to be groggy. Patient is now wheelchair bound with difficulty moving his left side. As a result, patient has not fallen as he is less active. 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 However, lately, since patient has not been sleeping well at night, he has been too tired to attend these classes.       Review of Systems:  Other than that mentioned above, the remainder of 12 systems reviewed were negative.    PMH: unchanged  PSH: unchanged  FH: unchanged  SH: unchanged    Medications:  Current Outpatient Medications   Medication Sig Dispense Refill     acetaminophen (TYLENOL) 500 MG tablet Take 1,000 mg by  mouth 2 times daily       amLODIPine (NORVASC) 10 MG tablet Take 10 mg by mouth daily       ammonium lactate (AMLACTIN) 12 % external cream        Cholecalciferol (VITAMIN D) 2000 units CAPS Take 2,000 Units by mouth daily       finasteride (PROSCAR) 5 MG tablet Take 5 mg by mouth daily       lisinopril (PRINIVIL/ZESTRIL) 30 MG tablet Take 30 mg by mouth daily       metoprolol succinate (TOPROL-XL) 100 MG 24 hr tablet Take 100 mg by mouth daily        mirtazapine (REMERON) 15 MG tablet Take 0.5 tablets (7.5 mg) by mouth At Bedtime 30 tablet 3     nystatin (MYCOSTATIN) 670598 UNIT/GM external ointment Apply thin layer to groin twice a day       polyethylene glycol (MIRALAX/GLYCOLAX) powder MIX 17GM WITH 8OZ FLUID THEN TAKE BY MOUTH DAILY AS NEEDED  99     polyvinyl alcohol (LIQUIFILM TEARS) 1.4 % ophthalmic solution Place 1 drop into both eyes 3 times daily       potassium chloride SA (K-DUR/KLOR-CON M) 20 MEQ CR tablet Take 20 mEq by mouth 2 times daily        senna-docusate (SENOKOT-S/PERICOLACE) 8.6-50 MG tablet Take 2 tablets by mouth 2 times daily as needed       tamsulosin (FLOMAX) 0.4 MG capsule Take 0.4 mg by mouth daily        acetaminophen (TYLENOL) 325 MG tablet Take 325-650 mg by mouth       aspirin 81 MG EC tablet Take 81 mg by mouth daily       CARDURA XL 8 MG TB24 Take 1 tablet by mouth At Bedtime  99     doxazosin (CARDURA) 8 MG tablet Take 8 mg by mouth       JANTOVEN 2.5 MG tablet        lisinopril (PRINIVIL/ZESTRIL) 40 MG tablet Take 40 mg by mouth daily       traZODone (DESYREL) 50 MG tablet Take 1 tablet (50 mg) by mouth At Bedtime (Patient not taking: Reported on 8/7/2019) 60 tablet 3     warfarin (COUMADIN) 5 MG tablet Take 1 tab (5mg) by mouth on Mondays and 0.5 tab (2.5mg) all other days of the week OR as directed by [facility]           Physical Exam:  The patient's  blood pressure is 139/92 (abnormal) and his pulse is 85. His respiration is 16 and oxygen saturation is 95%.     Physical Exam:  GENERAL: sleepy but opens eyes easily with verbal stimulation, mildly attentive, appropriately groomed, drooling with tissues at hand  HEENT: normocephalic, eyes open with no discharge, nares patent, oropharynx clear-no lesions  CHEST: normal configuration, chest rise equal b/l, non labored breathing   EXTREMITIES: no edema/cyanosis in BUE/BLE  PSYCH: mood stable    Neurologic Exam:  MENTAL STATUS: Bradyphrenic and oriented to person. Follows commands. Attention span and concentration intact.   SPEECH: decreased verbal output, intact comprehension and hypophonia, dysarthric   CN: visual fields intact in all fields, decreased vertical gaze, no nystagmus, jerky horizontal saccades, PERRL, facial movement symmetric, hearing grossly intact to conversation, tongue protrudes midline, hypomimia, decreased blink rate  MOTOR: Moves right side against gravity, limited movement of LUE/LLE, able to raise left arm 10 degrees parallel to ground on command  SENSATION: intact to light touch throughout   COORDINATION: no dysmetria with FTN on RUE  GAIT: wheelchair bound    Movement Disorders Assessment:  Facial Expression: Masked or fixed facies with severe loss of facial expression; lips parted 1/4 inch or more.  Rest Tremor (R; L): Absent.; Absent.  Action/Postural Tremor (R; L): Absent.; Absent.  Rigidity (RUE; LUE): Marked, but full range of motion easily achieved.; Severe, range of motion achieved with difficulty.. (RLE; LLE):Marked, but full range of motion easily achieved.; Marked, but full range of motion easily achieved..  Finger Taps (R; L): Severely impaired; frequent hesitation in initiating movements; arrests in ongoing movement.; Can barely perform the task.  Hand opening & closing (R; L): Moderately impaired; early fatiguing; occasional arrests in movement.; Can barely perform the task.  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  Arising from chair - arms folded across chest: Unable to arise without help.  Posture: Severely stooped posture with kyphosis; moderately leaning to one side.  Gait: Cannot walk at all, even with assistance.  Postural Stability: Unable to stand without assistance.  Body Bradykinesia: Marked slowness, poverty or small amplitude of movement.    Impression:  Elsy Paul is a 74 year old male with cortical basal degeneration here for follow-up. Patient's biggest concern this visit is poor sleep at night and day time fatigue. Family reports that mirtazapine was helping with this symptoms, however, patient stopped taking this med as he believed it was causing fatigue. Patient's exam in largely unchanged. He continues to have marked rigidity in LUE with very limited AROM and moderate rigidity in RUE and BLE. Patient is now wheelchair bound. Patient and family report that he has developed sialorrhea and carries a napkin with him most of the time. We discussed treatment of sialorrhea and LUE dystonia with botulinum toxin injections, however, patient is currently uninterested.     1. 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 Insomnia with associated daytime fatigue   4. Sialorrhea    Plan:   - Decrease mirtazapine to 7.5 mg at bedtime to improve sleep and daytime fatigue.   - We encourage regular exercise.  - Consider botulinum injections to treat left arm dystonia and sialorrhea.     Will return to our clinic in 3 months or sooner as needed.    Merlene Hernandez DO  Movement Disorders Fellow    Patient seen and discussed with Dr. Siu.     I, Chaz Siu MD, personally interviewed, examined and evaluated this patient on 11/19/2019.  I discussed the patient with Dr. Merlene Hernandez and agree with the assessment, examination and plan of care documented by .  I personally reviewed the vital signs, medications and  labs/imaging.    Chaz Siu md

## 2019-11-19 NOTE — PROGRESS NOTES
Department of Neurology  Movement Disorders Division     Patient: Elsy Paul   MRN: [id number]   : 1945   Date of Visit: 2019     Reason for visit: cortical basal degeneration follow up    History of Present Illness  Mr. Paul is a 74 year old  Male that presents to Neurology Movement clinic for follow up. Patient was last seen on 2019 where he was started on mirtazapine 15 mg to improve sleep.     History obtained from patient and accompanied by daughter and son.  Daughter reports that patient symptoms are unchanged.  She has noticed that he is drooling more often since his last clinic visit. Patient shares that he has a hard time falling asleep and is fatigued in the day. He associated this with mirtazapine and is refusing to take this medication. Daughter and son have observed that patient was sleeping well while on mirtazapine and was more alert during the day. They suspect that the patient may have mirtazapine confused with another medication that caused him to be groggy. Patient is now wheelchair bound with difficulty moving his left side. As a result, patient has not fallen as he is less active. 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 However, lately, since patient has not been sleeping well at night, he has been too tired to attend these classes.       Review of Systems:  Other than that mentioned above, the remainder of 12 systems reviewed were negative.    PMH: unchanged  PSH: unchanged  FH: unchanged  SH: unchanged    Medications:  Current Outpatient Medications   Medication Sig Dispense Refill     acetaminophen (TYLENOL) 500 MG tablet Take 1,000 mg by mouth 2 times daily       amLODIPine (NORVASC) 10 MG tablet Take 10 mg by mouth daily       ammonium lactate (AMLACTIN) 12 % external cream        Cholecalciferol (VITAMIN D) 2000 units CAPS Take 2,000 Units by mouth daily       finasteride (PROSCAR) 5 MG tablet Take 5 mg  by mouth daily       lisinopril (PRINIVIL/ZESTRIL) 30 MG tablet Take 30 mg by mouth daily       metoprolol succinate (TOPROL-XL) 100 MG 24 hr tablet Take 100 mg by mouth daily        mirtazapine (REMERON) 15 MG tablet Take 0.5 tablets (7.5 mg) by mouth At Bedtime 30 tablet 3     nystatin (MYCOSTATIN) 395370 UNIT/GM external ointment Apply thin layer to groin twice a day       polyethylene glycol (MIRALAX/GLYCOLAX) powder MIX 17GM WITH 8OZ FLUID THEN TAKE BY MOUTH DAILY AS NEEDED  99     polyvinyl alcohol (LIQUIFILM TEARS) 1.4 % ophthalmic solution Place 1 drop into both eyes 3 times daily       potassium chloride SA (K-DUR/KLOR-CON M) 20 MEQ CR tablet Take 20 mEq by mouth 2 times daily        senna-docusate (SENOKOT-S/PERICOLACE) 8.6-50 MG tablet Take 2 tablets by mouth 2 times daily as needed       tamsulosin (FLOMAX) 0.4 MG capsule Take 0.4 mg by mouth daily        acetaminophen (TYLENOL) 325 MG tablet Take 325-650 mg by mouth       aspirin 81 MG EC tablet Take 81 mg by mouth daily       CARDURA XL 8 MG TB24 Take 1 tablet by mouth At Bedtime  99     doxazosin (CARDURA) 8 MG tablet Take 8 mg by mouth       JANTOVEN 2.5 MG tablet        lisinopril (PRINIVIL/ZESTRIL) 40 MG tablet Take 40 mg by mouth daily       traZODone (DESYREL) 50 MG tablet Take 1 tablet (50 mg) by mouth At Bedtime (Patient not taking: Reported on 8/7/2019) 60 tablet 3     warfarin (COUMADIN) 5 MG tablet Take 1 tab (5mg) by mouth on Mondays and 0.5 tab (2.5mg) all other days of the week OR as directed by [facility]           Physical Exam:  The patient's  blood pressure is 139/92 (abnormal) and his pulse is 85. His respiration is 16 and oxygen saturation is 95%.    Physical Exam:  GENERAL: sleepy but opens eyes easily with verbal stimulation, mildly attentive, appropriately groomed, drooling with tissues at hand  HEENT: normocephalic, eyes open with no discharge, nares patent, oropharynx clear-no lesions  CHEST: normal  configuration, chest rise equal b/l, non labored breathing   EXTREMITIES: no edema/cyanosis in BUE/BLE  PSYCH: mood stable    Neurologic Exam:  MENTAL STATUS: Bradyphrenic and oriented to person. Follows commands. Attention span and concentration intact.   SPEECH: decreased verbal output, intact comprehension and hypophonia, dysarthric   CN: visual fields intact in all fields, decreased vertical gaze, no nystagmus, jerky horizontal saccades, PERRL, facial movement symmetric, hearing grossly intact to conversation, tongue protrudes midline, hypomimia, decreased blink rate  MOTOR: Moves right side against gravity, limited movement of LUE/LLE, able to raise left arm 10 degrees parallel to ground on command  SENSATION: intact to light touch throughout   COORDINATION: no dysmetria with FTN on RUE  GAIT: wheelchair bound    Movement Disorders Assessment:  Facial Expression: Masked or fixed facies with severe loss of facial expression; lips parted 1/4 inch or more.  Rest Tremor (R; L): Absent.; Absent.  Action/Postural Tremor (R; L): Absent.; Absent.  Rigidity (RUE; LUE): Marked, but full range of motion easily achieved.; Severe, range of motion achieved with difficulty.. (RLE; LLE):Marked, but full range of motion easily achieved.; Marked, but full range of motion easily achieved..  Finger Taps (R; L): Severely impaired; frequent hesitation in initiating movements; arrests in ongoing movement.; Can barely perform the task.  Hand opening & closing (R; L): Moderately impaired; early fatiguing; occasional arrests in movement.; Can barely perform the task.  Ideomotor apraxia in RUE  Toe taps (R; L): Severely impaired; frequent hesitation in initiating movements; arrests in ongoing movement.; Can barely perform the task.  Arising from chair - arms folded across chest: Unable to arise without help.  Posture: Severely stooped posture with kyphosis; moderately leaning to one side.  Gait: Cannot walk at all, even with  assistance.  Postural Stability: Unable to stand without assistance.  Body Bradykinesia: Marked slowness, poverty or small amplitude of movement.    Impression:  Elsy Paul is a 74 year old male with cortical basal degeneration here for follow-up. Patient's biggest concern this visit is poor sleep at night and day time fatigue. Family reports that mirtazapine was helping with this symptoms, however, patient stopped taking this med as he believed it was causing fatigue. Patient's exam in largely unchanged. He continues to have marked rigidity in LUE with very limited AROM and moderate rigidity in RUE and BLE. Patient is now wheelchair bound. Patient and family report that he has developed sialorrhea and carries a napkin with him most of the time. We discussed treatment of sialorrhea and LUE dystonia with botulinum toxin injections, however, patient is currently uninterested.     1. 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 Insomnia with associated daytime fatigue   4. Sialorrhea    Plan:   - Decrease mirtazapine to 7.5 mg at bedtime to improve sleep and daytime fatigue.   - We encourage regular exercise.  - Consider botulinum injections to treat left arm dystonia and sialorrhea.     Will return to our clinic in 3 months or sooner as needed.    Merlene Hernandez DO  Movement Disorders Fellow      Patient seen and discussed with Dr. Siu.     I, Chaz Siu MD, personally interviewed, examined and evaluated this patient on 11/19/2019.  I discussed the patient with Dr. Merlene Henrandez and agree with the assessment, examination and plan of care documented by .  I personally reviewed the vital signs, medications and labs/imaging.    Chaz Siu md

## 2019-11-19 NOTE — PATIENT INSTRUCTIONS
Plan:  - Decrease mirtazapine to 7.5 mg at bedtime to improve sleep and daytime fatigue.   - We encourage regular exercise.  - You may consider Botox injections to treat rigid left arm and drooling.     We will see you back in 3 months.

## 2019-11-19 NOTE — NURSING NOTE
Chief Complaint   Patient presents with     Parkinson     [facility] RETURN MOVEMENT DISORDER 3 MONTH F/U        Jorge Holman, EMT

## 2019-12-18 ENCOUNTER — RECORDS - HEALTHEAST (OUTPATIENT)
Dept: LAB | Facility: CLINIC | Age: 74
End: 2019-12-18

## 2019-12-18 LAB
ANION GAP SERPL CALCULATED.3IONS-SCNC: 8 MMOL/L (ref 5–18)
BUN SERPL-MCNC: 13 MG/DL (ref 8–28)
CALCIUM SERPL-MCNC: 9.1 MG/DL (ref 8.5–10.5)
CHLORIDE BLD-SCNC: 104 MMOL/L (ref 98–107)
CO2 SERPL-SCNC: 28 MMOL/L (ref 22–31)
CREAT SERPL-MCNC: 1.02 MG/DL (ref 0.7–1.3)
GFR SERPL CREATININE-BSD FRML MDRD: >60 ML/MIN/1.73M2
GLUCOSE BLD-MCNC: 83 MG/DL (ref 70–125)
POTASSIUM BLD-SCNC: 3.5 MMOL/L (ref 3.5–5)
SODIUM SERPL-SCNC: 140 MMOL/L (ref 136–145)

## 2020-02-03 ENCOUNTER — TRANSFERRED RECORDS (OUTPATIENT)
Dept: HEALTH INFORMATION MANAGEMENT | Facility: CLINIC | Age: 75
End: 2020-02-03

## 2020-02-04 ENCOUNTER — TRANSFERRED RECORDS (OUTPATIENT)
Dept: HEALTH INFORMATION MANAGEMENT | Facility: CLINIC | Age: 75
End: 2020-02-04

## 2020-02-10 ENCOUNTER — MYC MEDICAL ADVICE (OUTPATIENT)
Dept: NEUROLOGY | Facility: CLINIC | Age: 75
End: 2020-02-10

## 2020-02-19 ENCOUNTER — TRANSFERRED RECORDS (OUTPATIENT)
Dept: HEALTH INFORMATION MANAGEMENT | Facility: CLINIC | Age: 75
End: 2020-02-19

## 2020-02-20 ENCOUNTER — DOCUMENTATION ONLY (OUTPATIENT)
Facility: CLINIC | Age: 75
End: 2020-02-20

## 2020-02-25 ENCOUNTER — OFFICE VISIT (OUTPATIENT)
Dept: NEUROLOGY | Facility: CLINIC | Age: 75
End: 2020-02-25
Payer: MEDICARE

## 2020-02-25 VITALS — SYSTOLIC BLOOD PRESSURE: 119 MMHG | DIASTOLIC BLOOD PRESSURE: 81 MMHG | HEART RATE: 70 BPM | OXYGEN SATURATION: 95 %

## 2020-02-25 DIAGNOSIS — G31.85 CORTICOBASAL DEGENERATION (H): Primary | ICD-10-CM

## 2020-02-25 RX ORDER — SERTRALINE HYDROCHLORIDE 25 MG/1
25 TABLET, FILM COATED ORAL DAILY
COMMUNITY
Start: 2020-02-05

## 2020-02-25 RX ORDER — ONDANSETRON 4 MG/1
4 TABLET, ORALLY DISINTEGRATING ORAL EVERY 8 HOURS PRN
COMMUNITY
Start: 2020-01-11

## 2020-02-25 ASSESSMENT — UNIFIED PARKINSONS DISEASE RATING SCALE (UPDRS)
TOETAPPING_RIGHT: MODERATE: ANY OF THE FOLLOWING:  A) MORE THAN 5 INTERRUPTIONS  OR AT LEAST ONE LONGER ARREST (FREEZE) IN ONGOING MOVEMENT  B) MODERATE SLOWING C) THE AMPLITUDE DECREMENTS STARTING AFTER THE FIRST MOVEMENT.
AMPLITUDE_LIP_JAW: NORMAL: NO TREMOR.
PARKINSONS_MEDS: OFF
FREEZING_GAIT: SEVERE: FREEZES MULTIPLE TIMES DURING STRAIGHT WALKING.
FACIAL_EXPRESSION: MODERATE: MASKED FACIES WITH LIPS PARTED SOME OF THE TIME WHEN THE MOUTH IS AT REST.
CONSTANCY_TREMOR_ATREST: NORMAL: NO TREMOR.
TOTAL_SCORE_LEFT: 25
RIGIDITY_LLE: MODERATE: RIGIDITY DETECTED WITHOUT THE ACTIVATION MANEUVER. FULL RANGE OF MOTION IS ACHIEVED WITH EFFORT.
POSTURAL_STABILITY: SEVERE: VERY UNSTABLE, TENDS TO LOSE BALANCE SPONTANEOUSLY OR WITH JUST A GENTLE PULL ON THE SHOULDERS.
GAIT: SEVERE: CANNOT WALK AT ALL OR ONLY WITH ANOTHER PERSON'S ASSISTANCE.
HANDMOVEMENTS_LEFT: SEVERE: CANNOT OR CAN ONLY BARELY PERFORM THE TASK BECAUSE OF SLOWING, INTERRUPTIONS, OR DECREMENTS.
TOETAPPING_LEFT: MODERATE: ANY OF THE FOLLOWING:  A) MORE THAN 5 INTERRUPTIONS OR AT LEAST ONE LONGER ARREST (FREEZE) IN ONGOING MOVEMENT  B) MODERATE SLOWING C) THE AMPLITUDE DECREMENTS STARTING AFTER THE FIRST MOVEMENT.
FINGER_TAPPING_LEFT: SEVERE: CANNOT OR CAN ONLY BARELY PERFORM THE TASK BECAUSE OF SLOWING, INTERRUPTIONS, OR DECREMENTS.
RIGIDITY_RUE: MODERATE: RIGIDITY DETECTED WITHOUT THE ACTIVATION MANEUVER. FULL RANGE OF MOTION IS ACHIEVED WITH EFFORT.
LEG_AGILITY_RIGHT: MODERATE: ANY OF THE FOLLOWING:  A) MORE THAN 5 INTERRUPTIONS  OR AT LEAST ONE LONGER ARREST (FREEZE) IN ONGOING MOVEMENT  B) MODERATE SLOWING C) THE AMPLITUDE DECREMENTS STARTING AFTER THE FIRST MOVEMENT.
ARISING_CHAIR: SEVERE: UNABLE TO ARISE WITHOUT HELP.
AMPLITUDE_LUE: NORMAL: NO TREMOR.
AMPLITUDE_LLE: NORMAL: NO TREMOR.
AXIAL_SCORE: 31
AMPLITUDE_RLE: NORMAL: NO TREMOR.
PRONATION_SUPINATION_RIGHT: MODERATE: ANY OF THE FOLLOWING:  A) MORE THAN 5 INTERRUPTIONS  OR AT LEAST ONE LONGER ARREST (FREEZE) IN ONGOING MOVEMENT  B) MODERATE SLOWING C) THE AMPLITUDE DECREMENTS STARTING AFTER THE FIRST MOVEMENT.
SPEECH: MODERATE: SPEECH IS DIFFICULT TO UNDERSTAND TO THE POINT THAT SOME, BUT NOT MOST, SENTENCES ARE POORLY UNDERSTOOD.
LEG_AGILITY_LEFT: MODERATE: ANY OF THE FOLLOWING:  A) MORE THAN 5 INTERRUPTIONS  OR AT LEAST ONE LONGER ARREST (FREEZE) IN ONGOING MOVEMENT  B) MODERATE SLOWING C) THE AMPLITUDE DECREMENTS STARTING AFTER THE FIRST MOVEMENT.
FINGER_TAPPING_RIGHT: MODERATE: ANY OF THE FOLLOWING:  A) MORE THAN 5 INTERRUPTIONS OR AT LEAST ONE LONGER ARREST (FREEZE) IN ONGOING MOVEMENT  B) MODERATE SLOWING C) THE AMPLITUDE DECREMENTS STARTING AFTER THE FIRST MOVEMENT.
RIGIDITY_LUE: SEVERE: RIGIDITY DETECTED WITHOUT THE ACTIVATION MANEUVER AND FULL RANGE OF MOTION NOT ACHIEVED.
AMPLITUDE_RUE: NORMAL: NO TREMOR.
POSTURE: 4 SEVERE. FLEXION, SCOLIOSIS, OR LEANING WITH EXTREME ABNORMALITY OF POSTURE.
TOTAL_SCORE: 21
SPONTANEITY_OF_MOVEMENT: 3: MODERATE: MODERATE GLOBAL SLOWNESS AND POVERTY OF MOVEMENTS.
RIGIDITY_RLE: MODERATE: RIGIDITY DETECTED WITHOUT THE ACTIVATION MANEUVER. FULL RANGE OF MOTION IS ACHIEVED WITH EFFORT.
HANDMOVEMENTS_RIGHT: MODERATE: ANY OF THE FOLLOWING:  A) MORE THAN 5 INTERRUPTIONS  OR AT LEAST ONE LONGER ARREST (FREEZE) IN ONGOING MOVEMENT  B) MODERATE SLOWING C) THE AMPLITUDE DECREMENTS STARTING AFTER THE FIRST MOVEMENT.
RIGIDITY_NECK: MILD: RIGIDITY DETECTED WITHOUT THE ACTIVATION MANEUVER.  FULL RANGE OF MOTION IS EASILY ACHIEVED.
TOTAL_SCORE: 77
PRONATION_SUPINATION_LEFT: SEVERE: CANNOT OR CAN ONLY BARELY PERFORM THE TASK BECAUSE OF SLOWING, INTERRUPTIONS, OR DECREMENTS.

## 2020-02-25 ASSESSMENT — ENCOUNTER SYMPTOMS
CONSTIPATION: 0
PARALYSIS: 1
WEAKNESS: 1
ABDOMINAL PAIN: 0
DIARRHEA: 0
SEIZURES: 0
VOMITING: 0
EYE WATERING: 0
NAUSEA: 0
FLANK PAIN: 0
HEARTBURN: 0
DIFFICULTY URINATING: 0
DISTURBANCES IN COORDINATION: 1
MEMORY LOSS: 1
BLOATING: 0
DYSURIA: 0
HEADACHES: 0
DIZZINESS: 0
LOSS OF CONSCIOUSNESS: 0
JAUNDICE: 0
SPEECH CHANGE: 1
HEMATURIA: 0
BOWEL INCONTINENCE: 1
RECTAL PAIN: 0
TREMORS: 0
NUMBNESS: 0
DOUBLE VISION: 0
EYE PAIN: 0
TINGLING: 0
EYE REDNESS: 0
EYE IRRITATION: 1
BLOOD IN STOOL: 0

## 2020-02-25 ASSESSMENT — PATIENT HEALTH QUESTIONNAIRE - PHQ9
SUM OF ALL RESPONSES TO PHQ QUESTIONS 1-9: 15
SUM OF ALL RESPONSES TO PHQ QUESTIONS 1-9: 15
10. IF YOU CHECKED OFF ANY PROBLEMS, HOW DIFFICULT HAVE THESE PROBLEMS MADE IT FOR YOU TO DO YOUR WORK, TAKE CARE OF THINGS AT HOME, OR GET ALONG WITH OTHER PEOPLE: EXTREMELY DIFFICULT

## 2020-02-25 ASSESSMENT — PAIN SCALES - GENERAL: PAINLEVEL: NO PAIN (0)

## 2020-02-25 NOTE — LETTER
2/25/2020       RE: Elsy Paul  [address] [address]     Dear Colleague,    Thank you for referring your patient, Elsy Paul, to the [facility] NEUROLOGY at [facility]. Please see a copy of my visit note below.    Movement Disorder Clinic follow up note    Patient: Elsy Paul  Medical Record Number: [id number]  Encounter Date: February 25, 2020  PCP:Daron Ye    CC: Cortical basal degeneration    Impression:  1.  Cortical basal degeneration, evidence of progression  2.  Restricted extraocular movements with macro square wave jerks  3.  Oral pharyngeal dysphagia    Recommendations:  1.  Today the patient has clear evidence of progression.  His rigidity is now much more marked on the right side.  I suspect this explains his worsening seen at the nursing home.  2.  I had a long discussion with the patient's daughter Shanna, the patient's son Tyrell and the patient.  They are considering putting the patient in a hospice program.  We discussed what this would mean.  They have met with palliative care.  All of this is quite appropriate given his slow and probably progressive decline.  3.  At present unfortunately we have no medications to offer.  Nevertheless and she would like to return for 3-month follow-ups.    Return to clinic: 3 months    Interval Hx:: Mr. Elsy Paul returns to clinic today for follow-up of his cortical basal degeneration.  He had an episode in February 3 of 2020.  At the nursing home they felt he was leaning more to the right.  There may have been a left facial droop.  Concern was for a stroke.  He was hospitalized at [facility].  I read through the notes there.  He did have a neurological consultation.  They really found no acute findings.   An MR angiogram was performed.  There may have been some right vertebral narrowing but nothing that would explain his stroke.  His MRI of the brain was negative for acute stroke.  He recovered  and it was felt that his changes were a consequence of progressive cortical basal degeneration and not of stroke.    The patient has been stable since that time.  As part of the hospitalization he had a palliative care consultation which I read through.  They are now considering putting him in hospice care.  The patient is participating in this decision with his daughter and son.    The patient's Parkinson's score on the U PDR S scale is recorded in the flowsheet record.  His score is now 77 indicating severe parkinsonism.  He has marked rigidity today of the right side which is new.  His left side has been immobile in the past.        Current medications    Movement Disorder-related Medications                                                                                                                                                             Current Outpatient Medications   Medication     acetaminophen (TYLENOL) 500 MG tablet     amLODIPine (NORVASC) 10 MG tablet     ammonium lactate (AMLACTIN) 12 % external cream     Cholecalciferol (VITAMIN D) 2000 units CAPS     finasteride (PROSCAR) 5 MG tablet     lisinopril (PRINIVIL/ZESTRIL) 30 MG tablet     metoprolol succinate (TOPROL-XL) 100 MG 24 hr tablet     mirtazapine (REMERON) 15 MG tablet     nystatin (MYCOSTATIN) 237638 UNIT/GM external ointment     ondansetron (ZOFRAN-ODT) 4 MG ODT tab     polyethylene glycol (MIRALAX/GLYCOLAX) powder     polyvinyl alcohol (LIQUIFILM TEARS) 1.4 % ophthalmic solution     potassium chloride SA (K-DUR/KLOR-CON M) 20 MEQ CR tablet     senna-docusate (SENOKOT-S/PERICOLACE) 8.6-50 MG tablet     sertraline (ZOLOFT) 25 MG tablet     tamsulosin (FLOMAX) 0.4 MG capsule     acetaminophen (TYLENOL) 325 MG tablet     aspirin 81 MG EC tablet     CARDURA XL 8 MG TB24     doxazosin (CARDURA) 8 MG tablet     JANTOVEN 2.5 MG tablet     lisinopril (PRINIVIL/ZESTRIL) 40 MG tablet     traZODone (DESYREL) 50 MG tablet     warfarin (COUMADIN) 5  MG tablet     No current facility-administered medications for this visit.        Examination:  /81 (BP Location: Right arm, Patient Position: Sitting, Cuff Size: Adult Large)   Pulse 70   SpO2 95%   General- no distress, no rash, good pulses, no edema  Respiratory-auscultation over the anterior lung fields is clear  Cardiac- regular rate and rhythm    Neurologic  Mental status  Patient is alert, appropriate, speech is fluent and comprehension is intact    Cranial nerve testing  Pupils are equal and reactive to light, visual fields are full to confrontation bilaterally  Extraocular movements absent vertical gaze.  Prominent macro square wave jerks.  Facial sensation is intact, face is symmetric with rest and activation  Palate rises symmetrically, tongue protrudes at midline with normal movements  Sterocleidomastoid and trapezius strength is normal and symmetric    Motor  UPDRS Values 2/25/2020   Time: 10:22 AM   Medication Off   R Brain DBS: None   L Brain DBS: None   Speech 3   Facial Expression 3   Rigidity Neck 2   Rigidity RUE 3   Rigidity LUE 4   Rigidity RLE 3   Rigidity LLE 3   Finger Taps R 3   Finger Taps L 4   Hand Mvt R 3   Hand Mvt L 4   Pron-/Supinate R 3   Pron-/Supinate L 4   Toe Tap R 3   Toe Tap L 3   Leg Agility R 3   Leg Agility L 3   Arise From Chair 4   Gait 4   Gait Freezing 4   Postural Stability 4   Posture 4   Global Spont Mvt 3   Postural Tremor RUE 0   Postural Tremor LUE 0   Kinetic Tremor RUE 0   Kinetic Tremor LUE 0   Rest Tremor RUE 0   Rest Tremor LUE 0   Rest Tremor RLE 0   Rest Tremor LLE 0   Rest Tremor Lip/Jaw 0   Rest Tremor Constancy 0   Total Right 21   Total Left 25   Axial Total 31   Total 77     Sensation  Intact to pin, vibration   Proprioception intact in great toes and fingers    Tendon reflexes  Testing at brachioradialis, biceps, triceps, patella and achilles bilaterally showed normal reflexes, symmetrically, without Babinski or Mart  signs    Coordination  Finger nose finger testing: normalRapid alternating movements are normal.  Gait and Station: normal    25 minutes of total time was spent face-to-face with the patient over 50% of which was counseling..    Answers for HPI/ROS submitted by the patient on 2/25/2020   If you checked off any problems, how difficult have these problems made it for you to do your work, take care of things at home, or get along with other people?: Extremely difficult  PHQ9 TOTAL SCORE: 15  General Symptoms: No  Skin Symptoms: No  HENT Symptoms: No  EYE SYMPTOMS: Yes  HEART SYMPTOMS: No  LUNG SYMPTOMS: No  INTESTINAL SYMPTOMS: Yes  URINARY SYMPTOMS: Yes  REPRODUCTIVE SYMPTOMS: No  SKELETAL SYMPTOMS: No  BLOOD SYMPTOMS: No  NERVOUS SYSTEM SYMPTOMS: Yes  MENTAL HEALTH SYMPTOMS: No  Eye pain: No  Vision loss: No  Dry eyes: Yes  Watery eyes: No  Eye bulging: No  Double vision: No  Flashing of lights: No  Spots: No  Floaters: No  Redness: No  Crossed eyes: No  Tunnel Vision: No  Yellowing of eyes: No  Eye irritation: Yes  Heart burn or indigestion: No  Nausea: No  Vomiting: No  Abdominal pain: No  Bloating: No  Constipation: No  Diarrhea: No  Blood in stool: No  Black stools: No  Rectal or Anal pain: No  Fecal incontinence: Yes  Yellowing of skin or eyes: No  Vomit with blood: No  Change in stools: No  Trouble holding urine or incontinence: Yes  Pain or burning: No  Trouble starting or stopping: No  Increased frequency of urination: No  Blood in urine: No  Decreased frequency of urination: No  Frequent nighttime urination: Yes  Flank pain: No  Difficulty emptying bladder: No  Trouble with coordination: Yes  Dizziness or trouble with balance: No  Fainting or black-out spells: No  Memory loss: Yes  Headache: No  Seizures: No  Speech problems: Yes  Tingling: No  Tremor: No  Weakness: Yes  Difficulty walking: Yes  Paralysis: Yes  Numbness: No    Chaz Siu MD

## 2020-02-25 NOTE — PROGRESS NOTES
[facility] follow up note    Patient: Elsy Paul  Medical Record Number: [id number]  Encounter Date: February 25, 2020  PCP:Daron Ye    CC: Cortical basal degeneration    Impression:  1.  Cortical basal degeneration, evidence of progression  2.  Restricted extraocular movements with macro square wave jerks  3.  Oral pharyngeal dysphagia    Recommendations:  1.  Today the patient has clear evidence of progression.  His rigidity is now much more marked on the right side.  I suspect this explains his worsening seen at the nursing home.  2.  I had a long discussion with the patient's daughter Shanna, the patient's son Tyrell and the patient.  They are considering putting the patient in a hospice program.  We discussed what this would mean.  They have met with palliative care.  All of this is quite appropriate given his slow and probably progressive decline.  3.  At present unfortunately we have no medications to offer.  Nevertheless and she would like to return for 3-month follow-ups.    Return to clinic: 3 months    Interval Hx:: Mr. Elsy Paul returns to clinic today for follow-up of his cortical basal degeneration.  He had an episode in February 3 of 2020.  At the nursing home they felt he was leaning more to the right.  There may have been a left facial droop.  Concern was for a stroke.  He was hospitalized at [facility].  I read through the notes there.  He did have a neurological consultation.  They really found no acute findings.   An MR angiogram was performed.  There may have been some right vertebral narrowing but nothing that would explain his stroke.  His MRI of the brain was negative for acute stroke.  He recovered and it was felt that his changes were a consequence of progressive cortical basal degeneration and not of stroke.    The patient has been stable since that time.  As part of the hospitalization he had a palliative care consultation which I read through.  They are now  considering putting him in hospice care.  The patient is participating in this decision with his daughter and son.    The patient's Parkinson's score on the U PDR S scale is recorded in the flowsheet record.  His score is now 77 indicating severe parkinsonism.  He has marked rigidity today of the right side which is new.  His left side has been immobile in the past.        Current medications    Movement Disorder-related Medications                                                                                                                                                             Current Outpatient Medications   Medication     acetaminophen (TYLENOL) 500 MG tablet     amLODIPine (NORVASC) 10 MG tablet     ammonium lactate (AMLACTIN) 12 % external cream     Cholecalciferol (VITAMIN D) 2000 units CAPS     finasteride (PROSCAR) 5 MG tablet     lisinopril (PRINIVIL/ZESTRIL) 30 MG tablet     metoprolol succinate (TOPROL-XL) 100 MG 24 hr tablet     mirtazapine (REMERON) 15 MG tablet     nystatin (MYCOSTATIN) 945081 UNIT/GM external ointment     ondansetron (ZOFRAN-ODT) 4 MG ODT tab     polyethylene glycol (MIRALAX/GLYCOLAX) powder     polyvinyl alcohol (LIQUIFILM TEARS) 1.4 % ophthalmic solution     potassium chloride SA (K-DUR/KLOR-CON M) 20 MEQ CR tablet     senna-docusate (SENOKOT-S/PERICOLACE) 8.6-50 MG tablet     sertraline (ZOLOFT) 25 MG tablet     tamsulosin (FLOMAX) 0.4 MG capsule     acetaminophen (TYLENOL) 325 MG tablet     aspirin 81 MG EC tablet     CARDURA XL 8 MG TB24     doxazosin (CARDURA) 8 MG tablet     JANTOVEN 2.5 MG tablet     lisinopril (PRINIVIL/ZESTRIL) 40 MG tablet     traZODone (DESYREL) 50 MG tablet     warfarin (COUMADIN) 5 MG tablet     No current facility-administered medications for this visit.        Examination:  /81 (BP Location: Right arm, Patient Position: Sitting, Cuff Size: Adult Large)   Pulse 70   SpO2 95%   General- no distress, no rash, good pulses, no  edema  Respiratory-auscultation over the anterior lung fields is clear  Cardiac- regular rate and rhythm    Neurologic  Mental status  Patient is alert, appropriate, speech is fluent and comprehension is intact    Cranial nerve testing  Pupils are equal and reactive to light, visual fields are full to confrontation bilaterally  Extraocular movements absent vertical gaze.  Prominent macro square wave jerks.  Facial sensation is intact, face is symmetric with rest and activation  Palate rises symmetrically, tongue protrudes at midline with normal movements  Sterocleidomastoid and trapezius strength is normal and symmetric    Motor  UPDRS Values 2/25/2020   Time: 10:22 AM   Medication Off   R Brain DBS: None   L Brain DBS: None   Speech 3   Facial Expression 3   Rigidity Neck 2   Rigidity RUE 3   Rigidity LUE 4   Rigidity RLE 3   Rigidity LLE 3   Finger Taps R 3   Finger Taps L 4   Hand Mvt R 3   Hand Mvt L 4   Pron-/Supinate R 3   Pron-/Supinate L 4   Toe Tap R 3   Toe Tap L 3   Leg Agility R 3   Leg Agility L 3   Arise From Chair 4   Gait 4   Gait Freezing 4   Postural Stability 4   Posture 4   Global Spont Mvt 3   Postural Tremor RUE 0   Postural Tremor LUE 0   Kinetic Tremor RUE 0   Kinetic Tremor LUE 0   Rest Tremor RUE 0   Rest Tremor LUE 0   Rest Tremor RLE 0   Rest Tremor LLE 0   Rest Tremor Lip/Jaw 0   Rest Tremor Constancy 0   Total Right 21   Total Left 25   Axial Total 31   Total 77     Sensation  Intact to pin, vibration   Proprioception intact in great toes and fingers    Tendon reflexes  Testing at brachioradialis, biceps, triceps, patella and achilles bilaterally showed normal reflexes, symmetrically, without Babinski or Mart signs    Coordination  Finger nose finger testing: normalRapid alternating movements are normal.  Gait and Station: normal    25 minutes of total time was spent face-to-face with the patient over 50% of which was counseling..    Answers for HPI/ROS submitted by the patient on  2/25/2020   If you checked off any problems, how difficult have these problems made it for you to do your work, take care of things at home, or get along with other people?: Extremely difficult  PHQ9 TOTAL SCORE: 15  General Symptoms: No  Skin Symptoms: No  HENT Symptoms: No  EYE SYMPTOMS: Yes  HEART SYMPTOMS: No  LUNG SYMPTOMS: No  INTESTINAL SYMPTOMS: Yes  URINARY SYMPTOMS: Yes  REPRODUCTIVE SYMPTOMS: No  SKELETAL SYMPTOMS: No  BLOOD SYMPTOMS: No  NERVOUS SYSTEM SYMPTOMS: Yes  MENTAL HEALTH SYMPTOMS: No  Eye pain: No  Vision loss: No  Dry eyes: Yes  Watery eyes: No  Eye bulging: No  Double vision: No  Flashing of lights: No  Spots: No  Floaters: No  Redness: No  Crossed eyes: No  Tunnel Vision: No  Yellowing of eyes: No  Eye irritation: Yes  Heart burn or indigestion: No  Nausea: No  Vomiting: No  Abdominal pain: No  Bloating: No  Constipation: No  Diarrhea: No  Blood in stool: No  Black stools: No  Rectal or Anal pain: No  Fecal incontinence: Yes  Yellowing of skin or eyes: No  Vomit with blood: No  Change in stools: No  Trouble holding urine or incontinence: Yes  Pain or burning: No  Trouble starting or stopping: No  Increased frequency of urination: No  Blood in urine: No  Decreased frequency of urination: No  Frequent nighttime urination: Yes  Flank pain: No  Difficulty emptying bladder: No  Trouble with coordination: Yes  Dizziness or trouble with balance: No  Fainting or black-out spells: No  Memory loss: Yes  Headache: No  Seizures: No  Speech problems: Yes  Tingling: No  Tremor: No  Weakness: Yes  Difficulty walking: Yes  Paralysis: Yes  Numbness: No

## 2020-02-25 NOTE — LETTER
2/25/2020    RE: Elsy Paul  [address] [facility] [address]   Movement Disorder Clinic follow up note    Patient: Elsy Paul  Medical Record Number: [id number]  Encounter Date: February 25, 2020  PCP:Daron Ye    CC: Cortical basal degeneration    Impression:  1.  Cortical basal degeneration, evidence of progression  2.  Restricted extraocular movements with macro square wave jerks  3.  Oral pharyngeal dysphagia    Recommendations:  1.  Today the patient has clear evidence of progression.  His rigidity is now much more marked on the right side.  I suspect this explains his worsening seen at the nursing home.  2.  I had a long discussion with the patient's daughter Shanna, the patient's son Tyrell and the patient.  They are considering putting the patient in a hospice program.  We discussed what this would mean.  They have met with palliative care.  All of this is quite appropriate given his slow and probably progressive decline.  3.  At present unfortunately we have no medications to offer.  Nevertheless and she would like to return for 3-month follow-ups.    Return to clinic: 3 months    Interval Hx:: Mr. Elsy Paul returns to clinic today for follow-up of his cortical basal degeneration.  He had an episode in February 3 of 2020.  At the nursing home they felt he was leaning more to the right.  There may have been a left facial droop.  Concern was for a stroke.  He was hospitalized at [facility].  I read through the notes there.  He did have a neurological consultation.  They really found no acute findings.   An MR angiogram was performed.  There may have been some right vertebral narrowing but nothing that would explain his stroke.  His MRI of the brain was negative for acute stroke.  He recovered and it was felt that his changes were a consequence of progressive cortical basal degeneration and not of stroke.    The patient has been stable since that time.  As part of the hospitalization he had  a palliative care consultation which I read through.  They are now considering putting him in hospice care.  The patient is participating in this decision with his daughter and son.    The patient's Parkinson's score on the U PDR S scale is recorded in the flowsheet record.  His score is now 77 indicating severe parkinsonism.  He has marked rigidity today of the right side which is new.  His left side has been immobile in the past.        Current medications    Movement Disorder-related Medications                                                                                                                                                             Current Outpatient Medications   Medication     acetaminophen (TYLENOL) 500 MG tablet     amLODIPine (NORVASC) 10 MG tablet     ammonium lactate (AMLACTIN) 12 % external cream     Cholecalciferol (VITAMIN D) 2000 units CAPS     finasteride (PROSCAR) 5 MG tablet     lisinopril (PRINIVIL/ZESTRIL) 30 MG tablet     metoprolol succinate (TOPROL-XL) 100 MG 24 hr tablet     mirtazapine (REMERON) 15 MG tablet     nystatin (MYCOSTATIN) 977227 UNIT/GM external ointment     ondansetron (ZOFRAN-ODT) 4 MG ODT tab     polyethylene glycol (MIRALAX/GLYCOLAX) powder     polyvinyl alcohol (LIQUIFILM TEARS) 1.4 % ophthalmic solution     potassium chloride SA (K-DUR/KLOR-CON M) 20 MEQ CR tablet     senna-docusate (SENOKOT-S/PERICOLACE) 8.6-50 MG tablet     sertraline (ZOLOFT) 25 MG tablet     tamsulosin (FLOMAX) 0.4 MG capsule     acetaminophen (TYLENOL) 325 MG tablet     aspirin 81 MG EC tablet     CARDURA XL 8 MG TB24     doxazosin (CARDURA) 8 MG tablet     JANTOVEN 2.5 MG tablet     lisinopril (PRINIVIL/ZESTRIL) 40 MG tablet     traZODone (DESYREL) 50 MG tablet     warfarin (COUMADIN) 5 MG tablet     No current facility-administered medications for this visit.        Examination:  /81 (BP Location: Right arm, Patient Position: Sitting, Cuff Size: Adult Large)   Pulse 70   SpO2  95%   General- no distress, no rash, good pulses, no edema  Respiratory-auscultation over the anterior lung fields is clear  Cardiac- regular rate and rhythm    Neurologic  Mental status  Patient is alert, appropriate, speech is fluent and comprehension is intact    Cranial nerve testing  Pupils are equal and reactive to light, visual fields are full to confrontation bilaterally  Extraocular movements absent vertical gaze.  Prominent macro square wave jerks.  Facial sensation is intact, face is symmetric with rest and activation  Palate rises symmetrically, tongue protrudes at midline with normal movements  Sterocleidomastoid and trapezius strength is normal and symmetric    Motor  UPDRS Values 2/25/2020   Time: 10:22 AM   Medication Off   R Brain DBS: None   L Brain DBS: None   Speech 3   Facial Expression 3   Rigidity Neck 2   Rigidity RUE 3   Rigidity LUE 4   Rigidity RLE 3   Rigidity LLE 3   Finger Taps R 3   Finger Taps L 4   Hand Mvt R 3   Hand Mvt L 4   Pron-/Supinate R 3   Pron-/Supinate L 4   Toe Tap R 3   Toe Tap L 3   Leg Agility R 3   Leg Agility L 3   Arise From Chair 4   Gait 4   Gait Freezing 4   Postural Stability 4   Posture 4   Global Spont Mvt 3   Postural Tremor RUE 0   Postural Tremor LUE 0   Kinetic Tremor RUE 0   Kinetic Tremor LUE 0   Rest Tremor RUE 0   Rest Tremor LUE 0   Rest Tremor RLE 0   Rest Tremor LLE 0   Rest Tremor Lip/Jaw 0   Rest Tremor Constancy 0   Total Right 21   Total Left 25   Axial Total 31   Total 77     Sensation  Intact to pin, vibration   Proprioception intact in great toes and fingers    Tendon reflexes  Testing at brachioradialis, biceps, triceps, patella and achilles bilaterally showed normal reflexes, symmetrically, without Babinski or Mart signs    Coordination  Finger nose finger testing: normalRapid alternating movements are normal.  Gait and Station: normal    25 minutes of total time was spent face-to-face with the patient over 50% of which was  counseling..    Answers for HPI/ROS submitted by the patient on 2/25/2020   If you checked off any problems, how difficult have these problems made it for you to do your work, take care of things at home, or get along with other people?: Extremely difficult  PHQ9 TOTAL SCORE: 15  General Symptoms: No  Skin Symptoms: No  HENT Symptoms: No  EYE SYMPTOMS: Yes  HEART SYMPTOMS: No  LUNG SYMPTOMS: No  INTESTINAL SYMPTOMS: Yes  URINARY SYMPTOMS: Yes  REPRODUCTIVE SYMPTOMS: No  SKELETAL SYMPTOMS: No  BLOOD SYMPTOMS: No  NERVOUS SYSTEM SYMPTOMS: Yes  MENTAL HEALTH SYMPTOMS: No  Eye pain: No  Vision loss: No  Dry eyes: Yes  Watery eyes: No  Eye bulging: No  Double vision: No  Flashing of lights: No  Spots: No  Floaters: No  Redness: No  Crossed eyes: No  Tunnel Vision: No  Yellowing of eyes: No  Eye irritation: Yes  Heart burn or indigestion: No  Nausea: No  Vomiting: No  Abdominal pain: No  Bloating: No  Constipation: No  Diarrhea: No  Blood in stool: No  Black stools: No  Rectal or Anal pain: No  Fecal incontinence: Yes  Yellowing of skin or eyes: No  Vomit with blood: No  Change in stools: No  Trouble holding urine or incontinence: Yes  Pain or burning: No  Trouble starting or stopping: No  Increased frequency of urination: No  Blood in urine: No  Decreased frequency of urination: No  Frequent nighttime urination: Yes  Flank pain: No  Difficulty emptying bladder: No  Trouble with coordination: Yes  Dizziness or trouble with balance: No  Fainting or black-out spells: No  Memory loss: Yes  Headache: No  Seizures: No  Speech problems: Yes  Tingling: No  Tremor: No  Weakness: Yes  Difficulty walking: Yes  Paralysis: Yes  Numbness: No    Chaz Siu MD

## 2020-03-11 ENCOUNTER — HEALTH MAINTENANCE LETTER (OUTPATIENT)
Age: 75
End: 2020-03-11

## 2021-01-03 ENCOUNTER — HEALTH MAINTENANCE LETTER (OUTPATIENT)
Age: 76
End: 2021-01-03

## 2021-02-05 ENCOUNTER — RECORDS - HEALTHEAST (OUTPATIENT)
Dept: LAB | Facility: CLINIC | Age: 76
End: 2021-02-05

## 2021-02-08 LAB
CREAT SERPL-MCNC: 0.91 MG/DL (ref 0.7–1.3)
GFR SERPL CREATININE-BSD FRML MDRD: >60 ML/MIN/1.73M2
POTASSIUM BLD-SCNC: 3.3 MMOL/L (ref 3.5–5)

## 2021-03-02 ENCOUNTER — TELEPHONE (OUTPATIENT)
Dept: NEUROLOGY | Facility: CLINIC | Age: 76
End: 2021-03-02

## 2021-03-02 NOTE — TELEPHONE ENCOUNTER
[facility] Call Center    Phone Message    May a detailed message be left on voicemail: yes     Reason for Call: Other: Sheila calling to request a call back from Dr. Siu to discuss Levar's health. She stated that she doesn't have good cell service in her new office but provided the best number to reach her at ([phone]). Please call her at your earliest convenience to discuss.     Action Taken: Message routed to:  [facility] ([facility]    Travel Screening: Not Applicable

## 2021-03-04 NOTE — TELEPHONE ENCOUNTER
Spoke with Shanna, patients daughter.  Patient has had marked decline.  Doesn't interact.  Sleeps 20 plus hours a day.  Counseled her.  They have been wonderful supportive children.    Chaz Siu MD

## 2021-04-25 ENCOUNTER — HEALTH MAINTENANCE LETTER (OUTPATIENT)
Age: 76
End: 2021-04-25

## 2021-10-10 ENCOUNTER — HEALTH MAINTENANCE LETTER (OUTPATIENT)
Age: 76
End: 2021-10-10

## 2022-01-01 ENCOUNTER — HEALTH MAINTENANCE LETTER (OUTPATIENT)
Age: 77
End: 2022-01-01